# Patient Record
Sex: MALE | Race: WHITE | NOT HISPANIC OR LATINO | Employment: FULL TIME | URBAN - METROPOLITAN AREA
[De-identification: names, ages, dates, MRNs, and addresses within clinical notes are randomized per-mention and may not be internally consistent; named-entity substitution may affect disease eponyms.]

---

## 2023-09-24 ENCOUNTER — APPOINTMENT (EMERGENCY)
Dept: RADIOLOGY | Facility: HOSPITAL | Age: 53
DRG: 246 | End: 2023-09-24
Payer: COMMERCIAL

## 2023-09-24 ENCOUNTER — HOSPITAL ENCOUNTER (INPATIENT)
Facility: HOSPITAL | Age: 53
LOS: 2 days | Discharge: HOME | DRG: 246 | End: 2023-09-26
Attending: EMERGENCY MEDICINE | Admitting: INTERNAL MEDICINE
Payer: COMMERCIAL

## 2023-09-24 DIAGNOSIS — I24.9 ACUTE CORONARY SYNDROME (HCC): Primary | ICD-10-CM

## 2023-09-24 PROBLEM — E78.5 DYSLIPIDEMIA: Status: ACTIVE | Noted: 2023-09-24

## 2023-09-24 PROBLEM — I21.4 NSTEMI (NON-ST ELEVATED MYOCARDIAL INFARCTION) (HCC): Status: ACTIVE | Noted: 2023-09-24

## 2023-09-24 LAB
2HR DELTA HS TROPONIN: 2716 NG/L
4HR DELTA HS TROPONIN: 7385 NG/L
ALBUMIN SERPL BCP-MCNC: 4.6 G/DL (ref 3.5–5)
ALP SERPL-CCNC: 104 U/L (ref 34–104)
ALT SERPL W P-5'-P-CCNC: 10 U/L (ref 7–52)
ANION GAP SERPL CALCULATED.3IONS-SCNC: 13 MMOL/L
APTT PPP: 29 SECONDS (ref 23–37)
APTT PPP: 40 SECONDS (ref 23–37)
AST SERPL W P-5'-P-CCNC: 16 U/L (ref 13–39)
BASOPHILS # BLD AUTO: 0.08 THOUSANDS/ÂΜL (ref 0–0.1)
BASOPHILS NFR BLD AUTO: 1 % (ref 0–1)
BILIRUB SERPL-MCNC: 0.78 MG/DL (ref 0.2–1)
BUN SERPL-MCNC: 16 MG/DL (ref 5–25)
CALCIUM SERPL-MCNC: 10 MG/DL (ref 8.4–10.2)
CARDIAC TROPONIN I PNL SERPL HS: 2803 NG/L
CARDIAC TROPONIN I PNL SERPL HS: 7472 NG/L
CARDIAC TROPONIN I PNL SERPL HS: 87 NG/L
CHLORIDE SERPL-SCNC: 101 MMOL/L (ref 96–108)
CHOLEST SERPL-MCNC: 248 MG/DL
CO2 SERPL-SCNC: 21 MMOL/L (ref 21–32)
CREAT SERPL-MCNC: 1.28 MG/DL (ref 0.6–1.3)
EOSINOPHIL # BLD AUTO: 0.3 THOUSAND/ÂΜL (ref 0–0.61)
EOSINOPHIL NFR BLD AUTO: 3 % (ref 0–6)
ERYTHROCYTE [DISTWIDTH] IN BLOOD BY AUTOMATED COUNT: 13.2 % (ref 11.6–15.1)
GFR SERPL CREATININE-BSD FRML MDRD: 63 ML/MIN/1.73SQ M
GLUCOSE SERPL-MCNC: 133 MG/DL (ref 65–140)
HCT VFR BLD AUTO: 53.6 % (ref 36.5–49.3)
HDLC SERPL-MCNC: 42 MG/DL
HGB BLD-MCNC: 18.3 G/DL (ref 12–17)
IMM GRANULOCYTES # BLD AUTO: 0.04 THOUSAND/UL (ref 0–0.2)
IMM GRANULOCYTES NFR BLD AUTO: 0 % (ref 0–2)
INR PPP: 0.89 (ref 0.84–1.19)
LDLC SERPL CALC-MCNC: 163 MG/DL (ref 0–100)
LYMPHOCYTES # BLD AUTO: 3.85 THOUSANDS/ÂΜL (ref 0.6–4.47)
LYMPHOCYTES NFR BLD AUTO: 34 % (ref 14–44)
MAGNESIUM SERPL-MCNC: 2.1 MG/DL (ref 1.9–2.7)
MCH RBC QN AUTO: 31.6 PG (ref 26.8–34.3)
MCHC RBC AUTO-ENTMCNC: 34.1 G/DL (ref 31.4–37.4)
MCV RBC AUTO: 93 FL (ref 82–98)
MONOCYTES # BLD AUTO: 1.02 THOUSAND/ÂΜL (ref 0.17–1.22)
MONOCYTES NFR BLD AUTO: 9 % (ref 4–12)
NEUTROPHILS # BLD AUTO: 6.1 THOUSANDS/ÂΜL (ref 1.85–7.62)
NEUTS SEG NFR BLD AUTO: 53 % (ref 43–75)
NONHDLC SERPL-MCNC: 206 MG/DL
NRBC BLD AUTO-RTO: 0 /100 WBCS
PLATELET # BLD AUTO: 217 THOUSANDS/UL (ref 149–390)
PMV BLD AUTO: 9.6 FL (ref 8.9–12.7)
POTASSIUM SERPL-SCNC: 4.2 MMOL/L (ref 3.5–5.3)
PROT SERPL-MCNC: 8.1 G/DL (ref 6.4–8.4)
PROTHROMBIN TIME: 12.6 SECONDS (ref 11.6–14.5)
RBC # BLD AUTO: 5.79 MILLION/UL (ref 3.88–5.62)
SODIUM SERPL-SCNC: 135 MMOL/L (ref 135–147)
TRIGL SERPL-MCNC: 216 MG/DL
WBC # BLD AUTO: 11.39 THOUSAND/UL (ref 4.31–10.16)

## 2023-09-24 PROCEDURE — 80061 LIPID PANEL: CPT | Performed by: EMERGENCY MEDICINE

## 2023-09-24 PROCEDURE — 99285 EMERGENCY DEPT VISIT HI MDM: CPT | Performed by: EMERGENCY MEDICINE

## 2023-09-24 PROCEDURE — 80053 COMPREHEN METABOLIC PANEL: CPT | Performed by: EMERGENCY MEDICINE

## 2023-09-24 PROCEDURE — 93005 ELECTROCARDIOGRAM TRACING: CPT

## 2023-09-24 PROCEDURE — 71045 X-RAY EXAM CHEST 1 VIEW: CPT

## 2023-09-24 PROCEDURE — 85025 COMPLETE CBC W/AUTO DIFF WBC: CPT | Performed by: EMERGENCY MEDICINE

## 2023-09-24 PROCEDURE — 96374 THER/PROPH/DIAG INJ IV PUSH: CPT

## 2023-09-24 PROCEDURE — 83735 ASSAY OF MAGNESIUM: CPT | Performed by: EMERGENCY MEDICINE

## 2023-09-24 PROCEDURE — 84484 ASSAY OF TROPONIN QUANT: CPT

## 2023-09-24 PROCEDURE — 99285 EMERGENCY DEPT VISIT HI MDM: CPT

## 2023-09-24 PROCEDURE — 85730 THROMBOPLASTIN TIME PARTIAL: CPT

## 2023-09-24 PROCEDURE — 96361 HYDRATE IV INFUSION ADD-ON: CPT

## 2023-09-24 PROCEDURE — 83036 HEMOGLOBIN GLYCOSYLATED A1C: CPT

## 2023-09-24 PROCEDURE — 85730 THROMBOPLASTIN TIME PARTIAL: CPT | Performed by: EMERGENCY MEDICINE

## 2023-09-24 PROCEDURE — 84484 ASSAY OF TROPONIN QUANT: CPT | Performed by: EMERGENCY MEDICINE

## 2023-09-24 PROCEDURE — 36415 COLL VENOUS BLD VENIPUNCTURE: CPT | Performed by: EMERGENCY MEDICINE

## 2023-09-24 PROCEDURE — 99223 1ST HOSP IP/OBS HIGH 75: CPT | Performed by: INTERNAL MEDICINE

## 2023-09-24 PROCEDURE — 85610 PROTHROMBIN TIME: CPT | Performed by: EMERGENCY MEDICINE

## 2023-09-24 RX ORDER — HEPARIN SODIUM 1000 [USP'U]/ML
4000 INJECTION, SOLUTION INTRAVENOUS; SUBCUTANEOUS ONCE
Status: COMPLETED | OUTPATIENT
Start: 2023-09-24 | End: 2023-09-24

## 2023-09-24 RX ORDER — CLOPIDOGREL BISULFATE 75 MG/1
75 TABLET ORAL DAILY
Status: DISCONTINUED | OUTPATIENT
Start: 2023-09-25 | End: 2023-09-24

## 2023-09-24 RX ORDER — METOPROLOL TARTRATE 5 MG/5ML
5 INJECTION INTRAVENOUS ONCE
Status: COMPLETED | OUTPATIENT
Start: 2023-09-24 | End: 2023-09-24

## 2023-09-24 RX ORDER — SODIUM CHLORIDE 9 MG/ML
75 INJECTION, SOLUTION INTRAVENOUS CONTINUOUS
Status: DISCONTINUED | OUTPATIENT
Start: 2023-09-24 | End: 2023-09-25

## 2023-09-24 RX ORDER — HEPARIN SODIUM 1000 [USP'U]/ML
2000 INJECTION, SOLUTION INTRAVENOUS; SUBCUTANEOUS EVERY 6 HOURS PRN
Status: DISCONTINUED | OUTPATIENT
Start: 2023-09-24 | End: 2023-09-26

## 2023-09-24 RX ORDER — ACETAMINOPHEN 325 MG/1
975 TABLET ORAL EVERY 8 HOURS PRN
Status: DISCONTINUED | OUTPATIENT
Start: 2023-09-24 | End: 2023-09-26 | Stop reason: HOSPADM

## 2023-09-24 RX ORDER — ATORVASTATIN CALCIUM 40 MG/1
40 TABLET, FILM COATED ORAL
Status: DISCONTINUED | OUTPATIENT
Start: 2023-09-24 | End: 2023-09-26 | Stop reason: HOSPADM

## 2023-09-24 RX ORDER — METOPROLOL TARTRATE 5 MG/5ML
2.5 INJECTION INTRAVENOUS ONCE
Status: DISCONTINUED | OUTPATIENT
Start: 2023-09-24 | End: 2023-09-24

## 2023-09-24 RX ORDER — HEPARIN SODIUM 10000 [USP'U]/100ML
3-20 INJECTION, SOLUTION INTRAVENOUS
Status: DISCONTINUED | OUTPATIENT
Start: 2023-09-24 | End: 2023-09-26

## 2023-09-24 RX ORDER — NITROGLYCERIN 0.4 MG/1
0.4 TABLET SUBLINGUAL
Status: DISCONTINUED | OUTPATIENT
Start: 2023-09-24 | End: 2023-09-26 | Stop reason: HOSPADM

## 2023-09-24 RX ORDER — HEPARIN SODIUM 1000 [USP'U]/ML
4000 INJECTION, SOLUTION INTRAVENOUS; SUBCUTANEOUS EVERY 6 HOURS PRN
Status: DISCONTINUED | OUTPATIENT
Start: 2023-09-24 | End: 2023-09-26

## 2023-09-24 RX ORDER — SODIUM CHLORIDE 9 MG/ML
3 INJECTION INTRAVENOUS
Status: DISCONTINUED | OUTPATIENT
Start: 2023-09-24 | End: 2023-09-26 | Stop reason: HOSPADM

## 2023-09-24 RX ORDER — METOPROLOL TARTRATE 5 MG/5ML
5 INJECTION INTRAVENOUS ONCE
Status: DISCONTINUED | OUTPATIENT
Start: 2023-09-24 | End: 2023-09-24

## 2023-09-24 RX ADMIN — TICAGRELOR 180 MG: 90 TABLET ORAL at 18:42

## 2023-09-24 RX ADMIN — HEPARIN SODIUM 4000 UNITS: 1000 INJECTION INTRAVENOUS; SUBCUTANEOUS at 21:54

## 2023-09-24 RX ADMIN — HEPARIN SODIUM 11.1 UNITS/KG/HR: 10000 INJECTION, SOLUTION INTRAVENOUS at 13:34

## 2023-09-24 RX ADMIN — METOROPROLOL TARTRATE 5 MG: 5 INJECTION, SOLUTION INTRAVENOUS at 12:22

## 2023-09-24 RX ADMIN — SODIUM CHLORIDE 75 ML/HR: 0.9 INJECTION, SOLUTION INTRAVENOUS at 12:26

## 2023-09-24 RX ADMIN — ATORVASTATIN CALCIUM 40 MG: 40 TABLET, FILM COATED ORAL at 17:26

## 2023-09-24 RX ADMIN — HEPARIN SODIUM 4000 UNITS: 1000 INJECTION INTRAVENOUS; SUBCUTANEOUS at 13:34

## 2023-09-24 NOTE — ASSESSMENT & PLAN NOTE
Pt with 2 days of diarrhea, nausea. Today was resting in bed when chest discomfort started, was feeling nauseous, vomited x1, continued to feel chest discomfort, left hand numbness, worsened after talking to family, began to be diaphoretic. EMS called. Received 324 ASA, and 1 dose of nitro.   Elevated trop x1     Plan  Cath planned for tomorrow  Echo pending  Start statin, ASA, hold plavix for now, nitro prn  Tele

## 2023-09-24 NOTE — H&P
[address] [facility]  H&P  Name: Charleen Jimenez 47 y.o. male I MRN: [id number]  Unit/Bed#: S -01 I Date of Admission: 9/24/2023   Date of Service: 9/24/2023 I Hospital Day: 0      Assessment/Plan   * NSTEMI (non-ST elevated myocardial infarction) [facility])  Assessment & Plan  Pt with 2 days of diarrhea, nausea. Today was resting in bed when chest discomfort started, was feeling nauseous, vomited x1, continued to feel chest discomfort, left hand numbness, worsened after talking to family, began to be diaphoretic. EMS called. Received 324 ASA, and 1 dose of nitro. Elevated trop x1     Plan  Cath planned for tomorrow  Echo pending  Start statin, ASA, hold plavix for now, nitro prn  Tele    Dyslipidemia  Assessment & Plan  Lab Results   Component Value Date    CHOLESTEROL 248 (H) 09/24/2023    TRIG 216 (H) 09/24/2023    HDL 42 09/24/2023    LDLCALC 163 (H) 09/24/2023     Started on Lipitor 40        VTE Pharmacologic Prophylaxis: VTE Score: 2 ACS rule out. Heparin Drip  Code Status: Level 1 - Full Code   Discussion with family: Updated  (son and daughter) at bedside. Anticipated Length of Stay: Patient will be admitted on an inpatient basis with an anticipated length of stay of greater than 2 midnights secondary to NSTEMI. Chief Complaint: NSTEMI     History of Present Illness:  Charleen Jimenez is a 47 y.o. male with a PMH of none who presents with acute chest discomfort. Patient complains of nausea, and diarrhea for the past 2 days. Today while resting in bed patient felt chest discomfort which she is never felt before, nonradiating felt nauseous and vomited X1. Patient continues to feel chest discomfort and left hand numbness which worsened after talking to family and developed diaphoresis. On route via EMS patient received 324 aspirin and 1 dose of nitro which improved his chest discomfort. Patient also explains history of whitecoat hypertension.   At this moment patient is resting in bed comfortably and denies headache, chest pain, nausea, abdominal pain. Review of Systems:  Review of Systems   Constitutional: Negative for chills and fever. Respiratory: Negative for shortness of breath. Cardiovascular: Negative for chest pain and palpitations. Gastrointestinal: Negative for abdominal pain, diarrhea, nausea and vomiting. Genitourinary: Negative for dysuria. Neurological: Negative for numbness and headaches. Past Medical and Surgical History:   History reviewed. No pertinent past medical history. History reviewed. No pertinent surgical history. Meds/Allergies:  Prior to Admission medications    Not on File     I have reviewed home medications with patient personally. Allergies: Allergies   Allergen Reactions   • Zoloft [Sertraline] Hives       Social History:  Marital Status: Single   Occupation: desk job   Patient Pre-hospital Living Situation: Home  Patient Pre-hospital Level of Mobility: walks  Patient Pre-hospital Diet Restrictions: none  Substance Use History:   Social History     Substance and Sexual Activity   Alcohol Use Never     Social History     Tobacco Use   Smoking Status Every Day   • Packs/day: 0.50   • Years: 20.00   • Total pack years: 10.00   • Types: Cigarettes   Smokeless Tobacco Not on file     Social History     Substance and Sexual Activity   Drug Use Never       Family History:  History reviewed. No pertinent family history. Physical Exam:     Vitals:   Blood Pressure: 136/89 (09/24/23 1423)  Pulse: 56 (09/24/23 1423)  Temperature: 98 °F (36.7 °C) (09/24/23 1220)  Temp Source: Oral (09/24/23 1220)  Respirations: 18 (09/24/23 1423)  Height: 6' 4" (193 cm) (09/24/23 1220)  Weight - Scale: 122 kg (268 lb 8.3 oz) (09/24/23 1214)  SpO2: 99 % (09/24/23 1423)    Physical Exam  Vitals and nursing note reviewed. Constitutional:       General: He is not in acute distress. Appearance: Normal appearance.    HENT:      Head: Normocephalic and atraumatic. Cardiovascular:      Rate and Rhythm: Normal rate and regular rhythm. Pulses: Normal pulses. Heart sounds: Normal heart sounds. No murmur heard. Pulmonary:      Effort: Pulmonary effort is normal.      Breath sounds: Wheezing present. Abdominal:      General: Bowel sounds are normal. There is no distension. Palpations: Abdomen is soft. Tenderness: There is no abdominal tenderness. There is no guarding. Neurological:      Mental Status: He is alert. Additional Data:     Lab Results:  Results from last 7 days   Lab Units 09/24/23  1221   WBC Thousand/uL 11.39*   HEMOGLOBIN g/dL 18.3*   HEMATOCRIT % 53.6*   PLATELETS Thousands/uL 217   NEUTROS PCT % 53   LYMPHS PCT % 34   MONOS PCT % 9   EOS PCT % 3     Results from last 7 days   Lab Units 09/24/23  1221   SODIUM mmol/L 135   POTASSIUM mmol/L 4.2   CHLORIDE mmol/L 101   CO2 mmol/L 21   BUN mg/dL 16   CREATININE mg/dL 1.28   ANION GAP mmol/L 13   CALCIUM mg/dL 10.0   ALBUMIN g/dL 4.6   TOTAL BILIRUBIN mg/dL 0.78   ALK PHOS U/L 104   ALT U/L 10   AST U/L 16   GLUCOSE RANDOM mg/dL 133     Results from last 7 days   Lab Units 09/24/23  1221   INR  0.89                   Lines/Drains:  Invasive Devices     Peripheral Intravenous Line  Duration           Peripheral IV 09/24/23 Left Antecubital <1 day    Peripheral IV 09/24/23 Right Antecubital <1 day                    Imaging: Reviewed radiology reports from this admission including: chest xray  XR chest 1 view portable   ED Interpretation by Rach Perales DO (09/24 1248)   No acute abnormality no infiltrate or effusion interpreted by me. EKG and Other Studies Reviewed on Admission:   · EKG: NSR. HR 62.    ** Please Note: This note has been constructed using a voice recognition system.  **

## 2023-09-24 NOTE — ED PROVIDER NOTES
History  Chief Complaint   Patient presents with   • Chest Pain     324 ASA given and 1 nitro PTA given by EMS     Hx from patient and paramedics - Chest discomfort  Pressure 30 minutes pta at rest.  Vomited once, diarrhea one episode. Tried smoking a cigarette and some advil without relief. , paramedics gave aspirin and 1 sl nitro and patient feels better but anxious. Paramedics noted st elevation on ekg anterior leads. None       History reviewed. No pertinent past medical history. History reviewed. No pertinent surgical history. History reviewed. No pertinent family history. I have reviewed and agree with the history as documented. E-Cigarette/Vaping     E-Cigarette/Vaping Substances     Social History     Tobacco Use   • Smoking status: Every Day     Packs/day: 0.50     Years: 20.00     Total pack years: 10.00     Types: Cigarettes   Substance Use Topics   • Alcohol use: Never   • Drug use: Never       Review of Systems   Constitutional: Positive for diaphoresis. Negative for chills and fever. HENT: Positive for congestion. Negative for rhinorrhea and sore throat. Respiratory: Positive for chest tightness. Negative for shortness of breath. Cardiovascular: Positive for chest pain. Gastrointestinal: Negative for constipation, diarrhea, nausea and vomiting. Genitourinary: Negative for dysuria and frequency. Skin: Negative for rash. All other systems reviewed and are negative. Physical Exam  Physical Exam  Vitals and nursing note reviewed. Constitutional:       Appearance: He is well-developed. He is diaphoretic. HENT:      Head: Normocephalic and atraumatic. Right Ear: External ear normal.      Left Ear: External ear normal.      Nose: Nose normal.   Eyes:      Conjunctiva/sclera: Conjunctivae normal.      Pupils: Pupils are equal, round, and reactive to light. Cardiovascular:      Rate and Rhythm: Normal rate and regular rhythm.       Heart sounds: Normal heart sounds. Pulmonary:      Effort: Pulmonary effort is normal. No respiratory distress. Breath sounds: Normal breath sounds. No wheezing. Chest:      Chest wall: No tenderness. Abdominal:      General: Bowel sounds are normal. There is no distension. Palpations: Abdomen is soft. Tenderness: There is no abdominal tenderness. Musculoskeletal:         General: No deformity. Normal range of motion. Cervical back: Normal range of motion and neck supple. No spinous process tenderness. Skin:     General: Skin is warm. Findings: No rash. Neurological:      General: No focal deficit present. Mental Status: He is alert. GCS: GCS eye subscore is 4. GCS verbal subscore is 5. GCS motor subscore is 6. Sensory: No sensory deficit. Psychiatric:         Mood and Affect: Mood is anxious.          Vital Signs  ED Triage Vitals   Temperature Pulse Respirations Blood Pressure SpO2   09/24/23 1220 09/24/23 1214 09/24/23 1214 09/24/23 1214 09/24/23 1214   98 °F (36.7 °C) 64 22 (!) 171/110 98 %      Temp Source Heart Rate Source Patient Position - Orthostatic VS BP Location FiO2 (%)   09/24/23 1220 09/24/23 1214 09/24/23 1214 09/24/23 1214 --   Oral Monitor Sitting Right arm       Pain Score       09/24/23 1416       No Pain           Vitals:    09/24/23 1230 09/24/23 1330 09/24/23 1345 09/24/23 1423   BP: (!) 163/101 140/95 138/98 136/89   Pulse: 58 (!) 54 56 56   Patient Position - Orthostatic VS: Sitting Sitting Sitting Sitting         Visual Acuity  Visual Acuity    Flowsheet Row Most Recent Value   L Pupil Size (mm) 4   R Pupil Size (mm) 4          ED Medications  Medications   sodium chloride (PF) 0.9 % injection 3 mL (has no administration in time range)   sodium chloride 0.9 % infusion (75 mL/hr Intravenous New Bag 9/24/23 1226)   heparin (porcine) 25,000 units in 0.45% NaCl 250 mL infusion (premix) (11.1 Units/kg/hr × 90 kg (Order-Specific) Intravenous Restarted 9/24/23 1427) heparin (porcine) injection 4,000 Units (has no administration in time range)   heparin (porcine) injection 2,000 Units (has no administration in time range)   acetaminophen (TYLENOL) tablet 975 mg (has no administration in time range)   nitroglycerin (NITROSTAT) SL tablet 0.4 mg (has no administration in time range)   atorvastatin (LIPITOR) tablet 40 mg (has no administration in time range)   aspirin (ECOTRIN LOW STRENGTH) EC tablet 81 mg (has no administration in time range)   metoprolol (LOPRESSOR) injection 5 mg (5 mg Intravenous Given 9/24/23 1222)   heparin (porcine) injection 4,000 Units (4,000 Units Intravenous Given 9/24/23 1334)       Diagnostic Studies  Results Reviewed     Procedure Component Value Units Date/Time    APTT six (6) hours after Heparin bolus/drip initiation or dosing change [[id number]]     Lab Status: No result Specimen: Blood     HS Troponin I 2hr [[id number]]  (Abnormal) Collected: 09/24/23 1515    Lab Status: Final result Specimen: Blood from Arm, Left Updated: 09/24/23 1558     hs TnI 2hr 2,803 ng/L      Delta 2hr hsTnI 2,716 ng/L     HS Troponin I 4hr [[id number]]     Lab Status: No result Specimen: Blood     HS Troponin 0hr (reflex protocol) [[id number]]  (Abnormal) Collected: 09/24/23 1221    Lab Status: Final result Specimen: Blood from Arm, Left Updated: 09/24/23 1254     hs TnI 0hr 87 ng/L     Lipid panel [[id number]]  (Abnormal) Collected: 09/24/23 1221    Lab Status: Final result Specimen: Blood from Arm, Left Updated: 09/24/23 1249     Cholesterol 248 mg/dL      Triglycerides 216 mg/dL      HDL, Direct 42 mg/dL      LDL Calculated 163 mg/dL      Non-HDL-Chol (CHOL-HDL) 206 mg/dl     Magnesium [[id number]]  (Normal) Collected: 09/24/23 1221    Lab Status: Final result Specimen: Blood from Arm, Left Updated: 09/24/23 1249     Magnesium 2.1 mg/dL     Comprehensive metabolic panel [[id number]] Collected: 09/24/23 1221    Lab Status: Final result Specimen: Blood from Arm, Left Updated: 09/24/23 1249     Sodium 135 mmol/L      Potassium 4.2 mmol/L      Chloride 101 mmol/L      CO2 21 mmol/L      ANION GAP 13 mmol/L      BUN 16 mg/dL      Creatinine 1.28 mg/dL      Glucose 133 mg/dL      Calcium 10.0 mg/dL      AST 16 U/L      ALT 10 U/L      Alkaline Phosphatase 104 U/L      Total Protein 8.1 g/dL      Albumin 4.6 g/dL      Total Bilirubin 0.78 mg/dL      eGFR 63 ml/min/1.73sq m     Narrative:      [facility] (CKD):   •  Stage 1 with normal or high GFR (GFR > 90 mL/min/1.73 square meters)  •  Stage 2 Mild CKD (GFR = 60-89 mL/min/1.73 square meters)  •  Stage 3A Moderate CKD (GFR = 45-59 mL/min/1.73 square meters)  •  Stage 3B Moderate CKD (GFR = 30-44 mL/min/1.73 square meters)  •  Stage 4 Severe CKD (GFR = 15-29 mL/min/1.73 square meters)  •  Stage 5 End Stage CKD (GFR <15 mL/min/1.73 square meters)  Note: GFR calculation is accurate only with a steady state creatinine    Protime-INR [[id number]]  (Normal) Collected: 09/24/23 1221    Lab Status: Final result Specimen: Blood from Arm, Left Updated: 09/24/23 1243     Protime 12.6 seconds      INR 0.89    APTT [[id number]]  (Normal) Collected: 09/24/23 1221    Lab Status: Final result Specimen: Blood from Arm, Left Updated: 09/24/23 1243     PTT 29 seconds     CBC and differential [[id number]]  (Abnormal) Collected: 09/24/23 1221    Lab Status: Final result Specimen: Blood from Arm, Left Updated: 09/24/23 1233     WBC 11.39 Thousand/uL      RBC 5.79 Million/uL      Hemoglobin 18.3 g/dL      Hematocrit 53.6 %      MCV 93 fL      MCH 31.6 pg      MCHC 34.1 g/dL      RDW 13.2 %      MPV 9.6 fL      Platelets 795 Thousands/uL      nRBC 0 /100 WBCs      Neutrophils Relative 53 %      Immat GRANS % 0 %      Lymphocytes Relative 34 %      Monocytes Relative 9 %      Eosinophils Relative 3 %      Basophils Relative 1 %      Neutrophils Absolute 6.10 Thousands/µL      Immature Grans Absolute 0.04 Thousand/uL      Lymphocytes Absolute 3.85 Thousands/µL      Monocytes Absolute 1.02 Thousand/µL      Eosinophils Absolute 0.30 Thousand/µL      Basophils Absolute 0.08 Thousands/µL                  XR chest 1 view portable   ED Interpretation by Betty Rodriguez DO (09/24 1248)   No acute abnormality no infiltrate or effusion interpreted by me. Procedures  ECG 12 Lead Documentation Only    Date/Time: 9/24/2023 12:23 PM    Performed by: Betty Rodriguez DO  Authorized by: Betty Rodriguez DO    Indications / Diagnosis:  CHEST PAIN  ECG reviewed by me, the ED Provider: yes    Patient location:  ED  Previous ECG:     Previous ECG:  Compared to current    Comparison ECG info:  Ekg from ambulance with st elevations v1, v2, v3    Similarity:  Changes noted  Interpretation:     Interpretation: non-specific    Rate:     ECG rate:  62    ECG rate assessment: normal    Rhythm:     Rhythm: sinus rhythm    Ectopy:     Ectopy: none    QRS:     QRS axis:  Normal    QRS intervals:  Normal  Conduction:     Conduction: normal    ST segments:     ST segments:  Normal  T waves:     T waves: non-specific and inverted      Inverted:  AVR, aVL and V2  Comments: This EKG was interpreted by me. ED Course                               SBIRT 22yo+    Flowsheet Row Most Recent Value   Initial Alcohol Screen: US AUDIT-C     1. How often do you have a drink containing alcohol? 0 Filed at: 09/24/2023 1233   2. How many drinks containing alcohol do you have on a typical day you are drinking? 0 Filed at: 09/24/2023 1233   3a. Male UNDER 65: How often do you have five or more drinks on one occasion? 0 Filed at: 09/24/2023 1233   Audit-C Score 0 Filed at: 09/24/2023 1233   GILA: How many times in the past year have you. .. Used an illegal drug or used a prescription medication for non-medical reasons?  Never Filed at: 09/24/2023 1233                    Medical Decision Making  Differential includes acute coronary syndrome, coronary ischemia - less likely acute MI - reviewed ekgs with interventionist and cardiologist - agree with heparin, BP control, cath tomorrow unless symptoms recur then consider more emergent cath. Amount and/or Complexity of Data Reviewed  Labs: ordered. Radiology: ordered and independent interpretation performed. Risk  Prescription drug management. Decision regarding hospitalization. Disposition  Final diagnoses:   Acute coronary syndrome [facility])     Time reflects when diagnosis was documented in both MDM as applicable and the Disposition within this note     Time User Action Codes Description Comment    9/24/2023  1:31 PM Silvanaphvidya Ripangela Add [I24.9] Acute coronary syndrome ([address])     9/24/2023  3:53 PM Melodie Mcintosh Add [I21.4] NSTEMI (non-ST elevated myocardial infarction) ([address])     9/24/2023  3:53 PM Melodie Mcintosh Remove [I21.4] NSTEMI (non-ST elevated myocardial infarction) [facility])       ED Disposition     ED Disposition   Admit    Condition   Stable    Date/Time   Sun Sep 24, 2023  1:10 PM    Comment   Case was discussed with and the patient's admission status was agreed to be Admission Status: inpatient status to the service of Dr. . Alida Peabody    None         There are no discharge medications for this patient. No discharge procedures on file.     PDMP Review     None          ED Provider  Electronically Signed by           Rosana Godoy DO  09/24/23 2510

## 2023-09-24 NOTE — PLAN OF CARE
Problem: PAIN - ADULT  Goal: Verbalizes/displays adequate comfort level or baseline comfort level  Description: Interventions:  - Encourage patient to monitor pain and request assistance  - Assess pain using appropriate pain scale  - Administer analgesics based on type and severity of pain and evaluate response  - Implement non-pharmacological measures as appropriate and evaluate response  - Consider cultural and social influences on pain and pain management  - Notify physician/advanced practitioner if interventions unsuccessful or patient reports new pain  Outcome: Progressing     Problem: INFECTION - ADULT  Goal: Absence or prevention of progression during hospitalization  Description: INTERVENTIONS:  - Assess and monitor for signs and symptoms of infection  - Monitor lab/diagnostic results  - Monitor all insertion sites, i.e. indwelling lines, tubes, and drains  - Monitor endotracheal if appropriate and nasal secretions for changes in amount and color  - [place] appropriate cooling/warming therapies per order  - Administer medications as ordered  - Instruct and encourage patient and family to use good hand hygiene technique  - Identify and instruct in appropriate isolation precautions for identified infection/condition  Outcome: Progressing     Problem: Knowledge Deficit  Goal: Patient/family/caregiver demonstrates understanding of disease process, treatment plan, medications, and discharge instructions  Description: Complete learning assessment and assess knowledge base.   Interventions:  - Provide teaching at level of understanding  - Provide teaching via preferred learning methods  Outcome: Progressing

## 2023-09-24 NOTE — ASSESSMENT & PLAN NOTE
Lab Results   Component Value Date    CHOLESTEROL 248 (H) 09/24/2023    TRIG 216 (H) 09/24/2023    HDL 42 09/24/2023    LDLCALC 163 (H) 09/24/2023     Started on Lipitor 40

## 2023-09-24 NOTE — Clinical Note
Prepped: right groin and right radial. Prepped with: chlorhexidine. The site was clipped. The patient was draped.

## 2023-09-25 ENCOUNTER — APPOINTMENT (INPATIENT)
Dept: NON INVASIVE DIAGNOSTICS | Facility: HOSPITAL | Age: 53
DRG: 246 | End: 2023-09-25
Payer: COMMERCIAL

## 2023-09-25 PROBLEM — D58.2 ELEVATED HEMOGLOBIN (HCC): Status: ACTIVE | Noted: 2023-09-25

## 2023-09-25 PROBLEM — R19.7 DIARRHEA: Status: ACTIVE | Noted: 2023-09-25

## 2023-09-25 PROBLEM — I25.10 CORONARY ARTERY DISEASE INVOLVING NATIVE CORONARY ARTERY: Status: ACTIVE | Noted: 2023-09-25

## 2023-09-25 PROBLEM — D72.829 LEUKOCYTOSIS: Status: ACTIVE | Noted: 2023-09-25

## 2023-09-25 LAB
ANION GAP SERPL CALCULATED.3IONS-SCNC: 8 MMOL/L
AORTIC ROOT: 3.6 CM
APICAL FOUR CHAMBER EJECTION FRACTION: 65 %
APTT PPP: 59 SECONDS (ref 23–37)
APTT PPP: 65 SECONDS (ref 23–37)
ASCENDING AORTA: 4 CM
ATRIAL RATE: 55 BPM
ATRIAL RATE: 62 BPM
ATRIAL RATE: 67 BPM
BUN SERPL-MCNC: 15 MG/DL (ref 5–25)
CALCIUM SERPL-MCNC: 8.6 MG/DL (ref 8.4–10.2)
CHLORIDE SERPL-SCNC: 107 MMOL/L (ref 96–108)
CO2 SERPL-SCNC: 20 MMOL/L (ref 21–32)
CREAT SERPL-MCNC: 0.92 MG/DL (ref 0.6–1.3)
E WAVE DECELERATION TIME: 275 MS
ERYTHROCYTE [DISTWIDTH] IN BLOOD BY AUTOMATED COUNT: 13.2 % (ref 11.6–15.1)
EST. AVERAGE GLUCOSE BLD GHB EST-MCNC: 117 MG/DL
EST. AVERAGE GLUCOSE BLD GHB EST-MCNC: 117 MG/DL
FRACTIONAL SHORTENING: 30 (ref 28–44)
GFR SERPL CREATININE-BSD FRML MDRD: 95 ML/MIN/1.73SQ M
GLUCOSE SERPL-MCNC: 106 MG/DL (ref 65–140)
HBA1C MFR BLD: 5.7 %
HBA1C MFR BLD: 5.7 %
HCT VFR BLD AUTO: 48.1 % (ref 36.5–49.3)
HGB BLD-MCNC: 16.6 G/DL (ref 12–17)
INTERVENTRICULAR SEPTUM IN DIASTOLE (PARASTERNAL SHORT AXIS VIEW): 1.4 CM
INTERVENTRICULAR SEPTUM: 1.4 CM (ref 0.6–1.1)
LAAS-AP2: 13 CM2
LAAS-AP4: 16.3 CM2
LEFT ATRIUM SIZE: 3.5 CM
LEFT ATRIUM VOLUME (MOD BIPLANE): 35 ML
LEFT INTERNAL DIMENSION IN SYSTOLE: 3.2 CM (ref 2.1–4)
LEFT VENTRICULAR INTERNAL DIMENSION IN DIASTOLE: 4.6 CM (ref 3.5–6)
LEFT VENTRICULAR POSTERIOR WALL IN END DIASTOLE: 1.2 CM
LEFT VENTRICULAR STROKE VOLUME: 54 ML
LVSV (TEICH): 54 ML
MAGNESIUM SERPL-MCNC: 1.9 MG/DL (ref 1.9–2.7)
MCH RBC QN AUTO: 32.1 PG (ref 26.8–34.3)
MCHC RBC AUTO-ENTMCNC: 34.5 G/DL (ref 31.4–37.4)
MCV RBC AUTO: 93 FL (ref 82–98)
MV E'TISSUE VEL-LAT: 11 CM/S
MV E'TISSUE VEL-SEP: 9 CM/S
MV PEAK A VEL: 0.67 M/S
MV PEAK E VEL: 47 CM/S
MV STENOSIS PRESSURE HALF TIME: 80 MS
MV VALVE AREA P 1/2 METHOD: 2.75
P AXIS: 41 DEGREES
P AXIS: 56 DEGREES
P AXIS: 61 DEGREES
PLATELET # BLD AUTO: 191 THOUSANDS/UL (ref 149–390)
PMV BLD AUTO: 9.3 FL (ref 8.9–12.7)
POTASSIUM SERPL-SCNC: 4 MMOL/L (ref 3.5–5.3)
PR INTERVAL: 152 MS
PR INTERVAL: 152 MS
PR INTERVAL: 164 MS
QRS AXIS: 21 DEGREES
QRS AXIS: 36 DEGREES
QRS AXIS: 46 DEGREES
QRSD INTERVAL: 82 MS
QRSD INTERVAL: 84 MS
QRSD INTERVAL: 88 MS
QT INTERVAL: 392 MS
QT INTERVAL: 410 MS
QT INTERVAL: 418 MS
QTC INTERVAL: 392 MS
QTC INTERVAL: 414 MS
QTC INTERVAL: 424 MS
RBC # BLD AUTO: 5.17 MILLION/UL (ref 3.88–5.62)
RIGHT ATRIUM AREA SYSTOLE A4C: 13.5 CM2
RIGHT VENTRICLE ID DIMENSION: 2.8 CM
SL CV LEFT ATRIUM LENGTH A2C: 4.7 CM
SL CV LV EF: 65
SL CV PED ECHO LEFT VENTRICLE DIASTOLIC VOLUME (MOD BIPLANE) 2D: 95 ML
SL CV PED ECHO LEFT VENTRICLE SYSTOLIC VOLUME (MOD BIPLANE) 2D: 41 ML
SODIUM SERPL-SCNC: 135 MMOL/L (ref 135–147)
T WAVE AXIS: 94 DEGREES
T WAVE AXIS: 98 DEGREES
T WAVE AXIS: 99 DEGREES
TR MAX PG: 21 MMHG
TR PEAK VELOCITY: 2.3 M/S
TRICUSPID ANNULAR PLANE SYSTOLIC EXCURSION: 2.3 CM
TRICUSPID VALVE PEAK REGURGITATION VELOCITY: 2.27 M/S
VENTRICULAR RATE: 55 BPM
VENTRICULAR RATE: 62 BPM
VENTRICULAR RATE: 67 BPM
WBC # BLD AUTO: 11.48 THOUSAND/UL (ref 4.31–10.16)

## 2023-09-25 PROCEDURE — C1894 INTRO/SHEATH, NON-LASER: HCPCS | Performed by: INTERNAL MEDICINE

## 2023-09-25 PROCEDURE — 85347 COAGULATION TIME ACTIVATED: CPT

## 2023-09-25 PROCEDURE — C1725 CATH, TRANSLUMIN NON-LASER: HCPCS | Performed by: INTERNAL MEDICINE

## 2023-09-25 PROCEDURE — 99152 MOD SED SAME PHYS/QHP 5/>YRS: CPT | Performed by: INTERNAL MEDICINE

## 2023-09-25 PROCEDURE — 93454 CORONARY ARTERY ANGIO S&I: CPT | Performed by: INTERNAL MEDICINE

## 2023-09-25 PROCEDURE — 93306 TTE W/DOPPLER COMPLETE: CPT | Performed by: INTERNAL MEDICINE

## 2023-09-25 PROCEDURE — 85730 THROMBOPLASTIN TIME PARTIAL: CPT | Performed by: INTERNAL MEDICINE

## 2023-09-25 PROCEDURE — 92978 ENDOLUMINL IVUS OCT C 1ST: CPT | Performed by: INTERNAL MEDICINE

## 2023-09-25 PROCEDURE — 93010 ELECTROCARDIOGRAM REPORT: CPT | Performed by: INTERNAL MEDICINE

## 2023-09-25 PROCEDURE — C1769 GUIDE WIRE: HCPCS | Performed by: INTERNAL MEDICINE

## 2023-09-25 PROCEDURE — C1874 STENT, COATED/COV W/DEL SYS: HCPCS | Performed by: INTERNAL MEDICINE

## 2023-09-25 PROCEDURE — 99233 SBSQ HOSP IP/OBS HIGH 50: CPT | Performed by: INTERNAL MEDICINE

## 2023-09-25 PROCEDURE — 87493 C DIFF AMPLIFIED PROBE: CPT

## 2023-09-25 PROCEDURE — 93306 TTE W/DOPPLER COMPLETE: CPT

## 2023-09-25 PROCEDURE — 83036 HEMOGLOBIN GLYCOSYLATED A1C: CPT

## 2023-09-25 PROCEDURE — 99153 MOD SED SAME PHYS/QHP EA: CPT | Performed by: INTERNAL MEDICINE

## 2023-09-25 PROCEDURE — C9600 PERC DRUG-EL COR STENT SING: HCPCS | Performed by: INTERNAL MEDICINE

## 2023-09-25 PROCEDURE — B2111ZZ FLUOROSCOPY OF MULTIPLE CORONARY ARTERIES USING LOW OSMOLAR CONTRAST: ICD-10-PCS | Performed by: INTERNAL MEDICINE

## 2023-09-25 PROCEDURE — 87505 NFCT AGENT DETECTION GI: CPT

## 2023-09-25 PROCEDURE — 85027 COMPLETE CBC AUTOMATED: CPT

## 2023-09-25 PROCEDURE — C1887 CATHETER, GUIDING: HCPCS | Performed by: INTERNAL MEDICINE

## 2023-09-25 PROCEDURE — 83735 ASSAY OF MAGNESIUM: CPT

## 2023-09-25 PROCEDURE — 99255 IP/OBS CONSLTJ NEW/EST HI 80: CPT | Performed by: INTERNAL MEDICINE

## 2023-09-25 PROCEDURE — 80048 BASIC METABOLIC PNL TOTAL CA: CPT

## 2023-09-25 PROCEDURE — C1757 CATH, THROMBECTOMY/EMBOLECT: HCPCS | Performed by: INTERNAL MEDICINE

## 2023-09-25 PROCEDURE — 99254 IP/OBS CNSLTJ NEW/EST MOD 60: CPT | Performed by: INTERNAL MEDICINE

## 2023-09-25 PROCEDURE — C1753 CATH, INTRAVAS ULTRASOUND: HCPCS | Performed by: INTERNAL MEDICINE

## 2023-09-25 PROCEDURE — 027037Z DILATION OF CORONARY ARTERY, ONE ARTERY WITH FOUR OR MORE DRUG-ELUTING INTRALUMINAL DEVICES, PERCUTANEOUS APPROACH: ICD-10-PCS | Performed by: INTERNAL MEDICINE

## 2023-09-25 PROCEDURE — 02C03ZZ EXTIRPATION OF MATTER FROM CORONARY ARTERY, ONE ARTERY, PERCUTANEOUS APPROACH: ICD-10-PCS | Performed by: INTERNAL MEDICINE

## 2023-09-25 PROCEDURE — 92928 PRQ TCAT PLMT NTRAC ST 1 LES: CPT | Performed by: INTERNAL MEDICINE

## 2023-09-25 DEVICE — STENT ONYXNG40008UX ONYX 4.00X08RX
Type: IMPLANTABLE DEVICE | Status: FUNCTIONAL
Brand: ONYX FRONTIER™

## 2023-09-25 DEVICE — STENT ONYXNG40012UX ONYX 4.00X12RX
Type: IMPLANTABLE DEVICE | Status: FUNCTIONAL
Brand: ONYX FRONTIER™

## 2023-09-25 DEVICE — STENT ONYXNG35008UX ONYX 3.50X08RX
Type: IMPLANTABLE DEVICE | Status: FUNCTIONAL
Brand: ONYX FRONTIER™

## 2023-09-25 DEVICE — STENT ONYXNG27518UX ONYX 2.75X18RX
Type: IMPLANTABLE DEVICE | Status: FUNCTIONAL
Brand: ONYX FRONTIER™

## 2023-09-25 RX ORDER — LOPERAMIDE HYDROCHLORIDE 2 MG/1
2 CAPSULE ORAL 3 TIMES DAILY PRN
Status: DISCONTINUED | OUTPATIENT
Start: 2023-09-25 | End: 2023-09-25

## 2023-09-25 RX ORDER — VERAPAMIL HCL 2.5 MG/ML
AMPUL (ML) INTRAVENOUS CODE/TRAUMA/SEDATION MEDICATION
Status: DISCONTINUED | OUTPATIENT
Start: 2023-09-25 | End: 2023-09-25 | Stop reason: HOSPADM

## 2023-09-25 RX ORDER — FENTANYL CITRATE 50 UG/ML
INJECTION, SOLUTION INTRAMUSCULAR; INTRAVENOUS CODE/TRAUMA/SEDATION MEDICATION
Status: DISCONTINUED | OUTPATIENT
Start: 2023-09-25 | End: 2023-09-25 | Stop reason: HOSPADM

## 2023-09-25 RX ORDER — LIDOCAINE HYDROCHLORIDE 10 MG/ML
INJECTION, SOLUTION EPIDURAL; INFILTRATION; INTRACAUDAL; PERINEURAL CODE/TRAUMA/SEDATION MEDICATION
Status: DISCONTINUED | OUTPATIENT
Start: 2023-09-25 | End: 2023-09-25 | Stop reason: HOSPADM

## 2023-09-25 RX ORDER — MIDAZOLAM HYDROCHLORIDE 2 MG/2ML
INJECTION, SOLUTION INTRAMUSCULAR; INTRAVENOUS CODE/TRAUMA/SEDATION MEDICATION
Status: DISCONTINUED | OUTPATIENT
Start: 2023-09-25 | End: 2023-09-25 | Stop reason: HOSPADM

## 2023-09-25 RX ORDER — HEPARIN SODIUM 1000 [USP'U]/ML
INJECTION, SOLUTION INTRAVENOUS; SUBCUTANEOUS CODE/TRAUMA/SEDATION MEDICATION
Status: DISCONTINUED | OUTPATIENT
Start: 2023-09-25 | End: 2023-09-25 | Stop reason: HOSPADM

## 2023-09-25 RX ORDER — NITROGLYCERIN 20 MG/100ML
INJECTION INTRAVENOUS CODE/TRAUMA/SEDATION MEDICATION
Status: DISCONTINUED | OUTPATIENT
Start: 2023-09-25 | End: 2023-09-25 | Stop reason: HOSPADM

## 2023-09-25 RX ORDER — VERAPAMIL HYDROCHLORIDE 2.5 MG/ML
INJECTION, SOLUTION INTRAVENOUS CODE/TRAUMA/SEDATION MEDICATION
Status: DISCONTINUED | OUTPATIENT
Start: 2023-09-25 | End: 2023-09-25 | Stop reason: HOSPADM

## 2023-09-25 RX ADMIN — LOPERAMIDE HYDROCHLORIDE 2 MG: 2 CAPSULE ORAL at 04:30

## 2023-09-25 RX ADMIN — HEPARIN SODIUM 17.1 UNITS/KG/HR: 10000 INJECTION, SOLUTION INTRAVENOUS at 09:53

## 2023-09-25 RX ADMIN — TICAGRELOR 90 MG: 90 TABLET ORAL at 20:24

## 2023-09-25 RX ADMIN — TICAGRELOR 90 MG: 90 TABLET ORAL at 05:45

## 2023-09-25 RX ADMIN — HEPARIN SODIUM 2000 UNITS: 1000 INJECTION INTRAVENOUS; SUBCUTANEOUS at 05:40

## 2023-09-25 RX ADMIN — ASPIRIN 81 MG: 81 TABLET ORAL at 08:25

## 2023-09-25 RX ADMIN — LOPERAMIDE HYDROCHLORIDE 2 MG: 2 CAPSULE ORAL at 01:37

## 2023-09-25 RX ADMIN — ATORVASTATIN CALCIUM 40 MG: 40 TABLET, FILM COATED ORAL at 17:14

## 2023-09-25 NOTE — ASSESSMENT & PLAN NOTE
Prior to arrival patient complained of 2 days of watery diarrhea with nausea and vomiting x1. Patient continues to have watery diarrhea overnight 10-15 episodes. Also noticing some blood. Patient has a history of hemorrhoids. Plan  GI consult given heparin gtt  Trend Hgb   Stool studies to r/o c diff and other bacteria   Avoid imodium.  Consider Questran if worsening diarrhea

## 2023-09-25 NOTE — ASSESSMENT & PLAN NOTE
Most likely secondary to hemoconcentration versus reactive to lower GI virus   Results from last 7 days   Lab Units 09/25/23  1158 09/24/23  1221   WBC Thousand/uL 11.48* 11.39*   HEMOGLOBIN g/dL 16.6 18.3*   HEMATOCRIT % 48.1 53.6*   PLATELETS Thousands/uL 191 217   NEUTROS PCT %  --  53   LYMPHS PCT %  --  34   MONOS PCT %  --  9   EOS PCT %  --  3

## 2023-09-25 NOTE — ASSESSMENT & PLAN NOTE
Most likely secondary to hemoconcentration secondary to recent diarrhea    Recent Labs     09/24/23  1221 09/25/23  1158   HGB 18.3* 16.6

## 2023-09-25 NOTE — ASSESSMENT & PLAN NOTE
Pt with 2 days of diarrhea, nausea. Today was resting in bed when chest discomfort started, was feeling nauseous, vomited x1, continued to feel chest discomfort, left hand numbness, worsened after talking to family, began to be diaphoretic. EMS called. Received 324 ASA, and 1 dose of nitro.   Elevated trop x3    Plan  Cath scheduled for today   Echo pending  Continue statin, ASA, brillinta  Tele

## 2023-09-25 NOTE — CONSULTS
Consultation - [address] [place] [address] [facility]  [facility] 47 y.o. male MRN: [id number]  Unit/Bed#: S -01 Encounter: [id number]        Inpatient consult to gastroenterology  Consult performed by: Rita Mar PA-C  Consult ordered by: Darinel Tyler DO          Reason for Consult / Principal Problem: blood in stool    ASSESSMENT and PLAN:      56-year-old male who presented due to chest pain with concern for MI with plans for cardiac catheterization today, GI consulted for diarrhea with blood in stool. 1.  Diarrhea with blood in stool without abdominal pain  Patient reports some loose stools over the past few days followed by more significant diarrhea overnight. No associate abdominal pain. Reports initially nonbloody then with some bright red blood which he suspects is from significant mount of straining. No prior colonoscopy. Due to acute onset, possibly infectious etiology versus medicine side effect with possible hemorrhoids, underlying IBD appears less likely though is overdue for colon cancer screening as well. -In the setting of concern for MI at this time with plans for catheterization today, would recommend conservative management of diarrhea and blood in stool at this time. - From a GI standpoint, no indication to stop anticoagulation.  -Agree with stool studies. Follow-up results.  -Hemoglobin relatively stable at 16. Continue to monitor.  -Would recommend avoiding Imodium until C. difficile study comes back. Could consider Questran in the meantime if diarrhea becomes persistent.    -Patient is overdue for colonoscopy. If blood in the stool/diarrhea improve and hemoglobin stays stable this can be performed in the outpatient setting.   Would then need to discuss with cardiology in terms of timing for holding anticoagulation.  -Otherwise, would consider inpatient colonoscopy only if persistent significant blood in the stool or diarrhea leading to electrolyte imbalances, drops in hemoglobin, etc.    -------------------------------------------------------------------------------------------------------------------    HPI:     Terrell Avalos is a 60-year-old male with history of dyslipidemia who presented to the emergency room yesterday for chest pain with ST elevations on EKG during EMS on heparin drip seen by cardiology with plans for cardiac catheterization today. GI was consulted due to some loose stools with blood in stool. Hemoglobin was 18 on arrival, repeat this morning 16. Patient reports having some loose stools for the past few days with significant urgency however no blood in the stool. Since arrival he has been having more persistent diarrhea which was very urgent last night until he received Imodium. He reports the bowel movements were not bloody at first however became bloody. He reports he believes this is due to hemorrhoids due to the significant amount of straining he was having. He denies any associate abdominal pain with this. No nausea or vomiting. Denies any sick contacts, fevers at home, changes in diet. Denies any prior abdominal surgeries. No significant GI family history. Patient smokes a pack a day. No significant alcohol use. No prior EGD or colonoscopy. REVIEW OF SYSTEMS:    CONSTITUTIONAL: Denies any fever, chills, or rigors. Good appetite, and no recent weight loss. HEENT: No earache or tinnitus. Denies hearing loss or visual disturbances. CARDIOVASCULAR: +chest pain  RESPIRATORY: Denies any cough, hemoptysis, shortness of breath or dyspnea on exertion. GASTROINTESTINAL: As noted in the History of Present Illness. GENITOURINARY: No problems with urination. Denies any hematuria or dysuria. NEUROLOGIC: No dizziness or vertigo, denies headaches. MUSCULOSKELETAL: Denies any muscle or joint pain. SKIN: Denies skin rashes or itching.    ENDOCRINE: Denies excessive thirst. Denies intolerance to heat or cold.  PSYCHOSOCIAL: Denies depression or anxiety. Denies any recent memory loss. Historical Information   History reviewed. No pertinent past medical history. History reviewed. No pertinent surgical history. Social History   Social History     Substance and Sexual Activity   Alcohol Use Never     Social History     Substance and Sexual Activity   Drug Use Never     Social History     Tobacco Use   Smoking Status Every Day   • Packs/day: 0.50   • Years: 20.00   • Total pack years: 10.00   • Types: Cigarettes   Smokeless Tobacco Not on file     History reviewed. No pertinent family history. Meds/Allergies     No medications prior to admission. Current Facility-Administered Medications   Medication Dose Route Frequency   • acetaminophen (TYLENOL) tablet 975 mg  975 mg Oral Q8H PRN   • aspirin (ECOTRIN LOW STRENGTH) EC tablet 81 mg  81 mg Oral Daily   • atorvastatin (LIPITOR) tablet 40 mg  40 mg Oral Daily With Dinner   • heparin (porcine) 25,000 units in 0.45% NaCl 250 mL infusion (premix)  3-20 Units/kg/hr (Order-Specific) Intravenous Titrated   • heparin (porcine) injection 2,000 Units  2,000 Units Intravenous Q6H PRN   • heparin (porcine) injection 4,000 Units  4,000 Units Intravenous Q6H PRN   • nitroglycerin (NITROSTAT) SL tablet 0.4 mg  0.4 mg Sublingual Q5 Min PRN   • sodium chloride (PF) 0.9 % injection 3 mL  3 mL Intravenous Q1H PRN   • sodium chloride 0.9 % infusion  75 mL/hr Intravenous Continuous   • ticagrelor (BRILINTA) tablet 90 mg  90 mg Oral Q12H [address]       Allergies   Allergen Reactions   • Zoloft [Sertraline] Hives           Objective     Blood pressure 137/91, pulse 70, temperature 99 °F (37.2 °C), temperature source Oral, resp. rate 18, height 6' 4" (1.93 m), weight 122 kg (268 lb 8.3 oz), SpO2 98 %.     No intake or output data in the 24 hours ending 09/25/23 1127      PHYSICAL EXAM:      General Appearance:   Alert, cooperative, no distress, appears stated age    HEENT:   Normocephalic, atraumatic, anicteric. Neck:  Supple, symmetrical, trachea midline, no adenopathy. Lungs:   Clear to auscultation bilaterally; no rales, rhonchi or wheezing; respirations unlabored    Heart[de-identified]   S1 and S2 normal; regular rate and rhythm; no murmur, rub, or gallop. Abdomen:   Soft, non-tender, non-distended; normal bowel sounds; no masses, no organomegaly. Benign    Genitalia:   Deferred    Rectal:   Deferred    Extremities:  No cyanosis, clubbing or edema    Pulses:  2+ and symmetric all extremities    Skin:  Skin color, texture, turgor normal, no rashes or lesions    Lymph nodes:  No palpable cervical, axillary or inguinal lymphadenopathy        Lab Results:   Results from last 7 days   Lab Units 09/24/23  1221   WBC Thousand/uL 11.39*   HEMOGLOBIN g/dL 18.3*   HEMATOCRIT % 53.6*   PLATELETS Thousands/uL 217   NEUTROS PCT % 53   LYMPHS PCT % 34   MONOS PCT % 9   EOS PCT % 3     Results from last 7 days   Lab Units 09/24/23  1221   POTASSIUM mmol/L 4.2   CHLORIDE mmol/L 101   CO2 mmol/L 21   BUN mg/dL 16   CREATININE mg/dL 1.28   CALCIUM mg/dL 10.0   ALK PHOS U/L 104   ALT U/L 10   AST U/L 16     Results from last 7 days   Lab Units 09/24/23  1221   INR  0.89           Imaging Studies: I have personally reviewed pertinent imaging studies. XR chest 1 view portable    Result Date: 9/24/2023  Impression: No acute cardiopulmonary disease. Workstation performed: [id number]           Patient was seen and examined by Dr. Zoraida Peter. All coughlin medical decisions were made by Dr. Zoraida Peter. Thank you for allowing us to participate in the care of this present patient. We will follow-up with you closely.

## 2023-09-25 NOTE — PLAN OF CARE
Problem: PAIN - ADULT  Goal: Verbalizes/displays adequate comfort level or baseline comfort level  Description: Interventions:  - Encourage patient to monitor pain and request assistance  - Assess pain using appropriate pain scale  - Administer analgesics based on type and severity of pain and evaluate response  - Implement non-pharmacological measures as appropriate and evaluate response  - Consider cultural and social influences on pain and pain management  - Notify physician/advanced practitioner if interventions unsuccessful or patient reports new pain  Outcome: Progressing     Problem: INFECTION - ADULT  Goal: Absence or prevention of progression during hospitalization  Description: INTERVENTIONS:  - Assess and monitor for signs and symptoms of infection  - Monitor lab/diagnostic results  - Monitor all insertion sites, i.e. indwelling lines, tubes, and drains  - Monitor endotracheal if appropriate and nasal secretions for changes in amount and color  - Portia appropriate cooling/warming therapies per order  - Administer medications as ordered  - Instruct and encourage patient and family to use good hand hygiene technique  - Identify and instruct in appropriate isolation precautions for identified infection/condition  Outcome: Progressing     Problem: Knowledge Deficit  Goal: Patient/family/caregiver demonstrates understanding of disease process, treatment plan, medications, and discharge instructions  Description: Complete learning assessment and assess knowledge base.   Interventions:  - Provide teaching at level of understanding  - Provide teaching via preferred learning methods  Outcome: Progressing

## 2023-09-25 NOTE — PROGRESS NOTES
[facility]  Progress Note  Name: Evelyne Bender  MRN: [id number]  Unit/Bed#: S -01 I Date of Admission: 9/24/2023   Date of Service: 9/25/2023 I Hospital Day: 1    Assessment/Plan   * NSTEMI (non-ST elevated myocardial infarction) [facility])  Assessment & Plan  Pt with 2 days of diarrhea, nausea. Today was resting in bed when chest discomfort started, was feeling nauseous, vomited x1, continued to feel chest discomfort, left hand numbness, worsened after talking to family, began to be diaphoretic. EMS called. Received 324 ASA, and 1 dose of nitro. Elevated trop x3    Plan  Cath scheduled for today   Echo pending  Continue statin, ASA, brillinta  Tele    Coronary artery disease involving native coronary artery  Assessment & Plan  S/p Cath. LAD stent x4    Diarrhea  Assessment & Plan  Prior to arrival patient complained of 2 days of watery diarrhea with nausea and vomiting x1. Patient continues to have watery diarrhea overnight 10-15 episodes. Also noticing some blood. Patient has a history of hemorrhoids. Plan  GI consult given heparin gtt  Trend Hgb   Stool studies to r/o c diff and other bacteria   Avoid imodium.  Consider Questran if worsening diarrhea     Elevated hemoglobin (HCC)  Assessment & Plan   Most likely secondary to hemoconcentration secondary to recent diarrhea    Recent Labs     09/24/23  1221 09/25/23  1158   HGB 18.3* 16.6         Leukocytosis  Assessment & Plan  Most likely secondary to hemoconcentration versus reactive to lower GI virus   Results from last 7 days   Lab Units 09/25/23  1158 09/24/23  1221   WBC Thousand/uL 11.48* 11.39*   HEMOGLOBIN g/dL 16.6 18.3*   HEMATOCRIT % 48.1 53.6*   PLATELETS Thousands/uL 191 217   NEUTROS PCT %  --  53   LYMPHS PCT %  --  34   MONOS PCT %  --  9   EOS PCT %  --  3         Dyslipidemia  Assessment & Plan  Lab Results   Component Value Date    CHOLESTEROL 248 (H) 09/24/2023    TRIG 216 (H) 09/24/2023    HDL 42 2023    [facility] 163 (H) 2023     Started on Lipitor 40            VTE Pharmacologic Prophylaxis: VTE Score: 2 heparin gtt    Patient Centered Rounds: I performed bedside rounds with nursing staff today. Discussions with Specialists or Other Care Team Provider: Paula Quevedo    Education and Discussions with Family / Patient: Updated  (son) at bedside. Current Length of Stay: 1 day(s)  Current Patient Status: Inpatient   Discharge Plan: Anticipate discharge in 48-72 hrs to home. Code Status: Level 1 - Full Code    Subjective:   Overnight patient reports 10-15 episodes of diarrhea. Patient also noticed some blood but has a history of hemorrhoids. Patient denies chest pain, abdominal pain, shortness of breath, palpitations. Objective:     Vitals:   Temp (24hrs), Av.5 °F (36.9 °C), Min:98.1 °F (36.7 °C), Max:99 °F (37.2 °C)    Temp:  [98.1 °F (36.7 °C)-99 °F (37.2 °C)] 98.4 °F (36.9 °C)  HR:  [57-70] 57  Resp:  [18] 18  BP: (136-150)/(91-98) 150/98  SpO2:  [95 %-98 %] 97 %  Body mass index is 32.62 kg/m². Input and Output Summary (last 24 hours):   No intake or output data in the 24 hours ending 23 9045    Physical Exam:   Physical Exam  Vitals and nursing note reviewed. Constitutional:       Appearance: Normal appearance. HENT:      Head: Normocephalic and atraumatic. Cardiovascular:      Rate and Rhythm: Normal rate. Rhythm irregular. Pulses: Normal pulses. Heart sounds: Normal heart sounds. No murmur heard. No friction rub. No gallop. Pulmonary:      Effort: Pulmonary effort is normal. No respiratory distress. Breath sounds: Normal breath sounds. No wheezing or rales. Abdominal:      General: Bowel sounds are normal. There is no distension. Palpations: Abdomen is soft. Tenderness: There is no abdominal tenderness. There is no guarding. Skin:     General: Skin is warm and dry.       Capillary Refill: Capillary refill takes less than 2 seconds. Neurological:      General: No focal deficit present. Mental Status: He is alert and oriented to person, place, and time.           Additional Data:     Labs:  Results from last 7 days   Lab Units 09/25/23  1158 09/24/23  1221   WBC Thousand/uL 11.48* 11.39*   HEMOGLOBIN g/dL 16.6 18.3*   HEMATOCRIT % 48.1 53.6*   PLATELETS Thousands/uL 191 217   NEUTROS PCT %  --  53   LYMPHS PCT %  --  34   MONOS PCT %  --  9   EOS PCT %  --  3     Results from last 7 days   Lab Units 09/25/23  1158 09/24/23  1221   SODIUM mmol/L 135 135   POTASSIUM mmol/L 4.0 4.2   CHLORIDE mmol/L 107 101   CO2 mmol/L 20* 21   BUN mg/dL 15 16   CREATININE mg/dL 0.92 1.28   ANION GAP mmol/L 8 13   CALCIUM mg/dL 8.6 10.0   ALBUMIN g/dL  --  4.6   TOTAL BILIRUBIN mg/dL  --  0.78   ALK PHOS U/L  --  104   ALT U/L  --  10   AST U/L  --  16   GLUCOSE RANDOM mg/dL 106 133     Results from last 7 days   Lab Units 09/24/23  1221   INR  0.89         Results from last 7 days   Lab Units 09/24/23  1221   HEMOGLOBIN A1C % 5.7*           Lines/Drains:  Invasive Devices     Peripheral Intravenous Line  Duration           Peripheral IV 09/24/23 Left Antecubital 1 day    Peripheral IV 09/24/23 Right Antecubital 1 day                  Telemetry:  Telemetry Orders (From admission, onward)             24 Hour Telemetry Monitoring  Continuous x 24 Hours (Telem)        Question:  Reason for 24 Hour Telemetry  Answer:  PCI/EP study (including pacer and ICD implementation), Cardiac surgery, MI, abnormal cardiac cath, and chest pain- rule out MI                 Telemetry Reviewed: Normal Sinus Rhythm  Indication for Continued Telemetry Use: Acute MI/Unstable Angina/Rule out ACS             Imaging: Reviewed radiology reports from this admission including: chest xray    Recent Cultures (last 7 days):         Last 24 Hours Medication List:   Current Facility-Administered Medications   Medication Dose Route Frequency Provider Last Rate   • acetaminophen  975 mg Oral Q8H PRN Aristeo Goodman MD     • aspirin  81 mg Oral Daily Aristeo Goodman MD     • atorvastatin  40 mg Oral Daily With Dia Morgan MD     • heparin (porcine)  3-20 Units/kg/hr (Order-Specific) Intravenous Titrated Aristeo Goodman MD 17.1 Units/kg/hr (09/25/23 1253)   • heparin (porcine)  2,000 Units Intravenous Q6H PRN Aristeo Goodman MD     • heparin (porcine)  4,000 Units Intravenous Q6H PRN Aristeo Goodman MD     • nitroglycerin  0.4 mg Sublingual Q5 Min PRN Aristeo Goodman MD     • sodium chloride (PF)  3 mL Intravenous Q1H PRN Aristeo Goodman MD     • ticagrelor  90 mg Oral Q12H María Yen MD          Today, Patient Was Seen By: Aristeo Goodman MD    **Please Note: This note may have been constructed using a voice recognition system. **

## 2023-09-25 NOTE — UTILIZATION REVIEW
Initial Clinical Review    Admission: Date/Time/Statement:   Admission Orders (From admission, onward)     Ordered        09/24/23 1332  INPATIENT ADMISSION  Once                      Orders Placed This Encounter   Procedures   • INPATIENT ADMISSION     Standing Status:   Standing     Number of Occurrences:   1     Order Specific Question:   Level of Care     Answer:   Med Surg [16]     Order Specific Question:   Estimated length of stay     Answer:   More than 2 Midnights     Order Specific Question:   Certification     Answer:   I certify that inpatient services are medically necessary for this patient for a duration of greater than two midnights. See H&P and MD Progress Notes for additional information about the patient's course of treatment. ED Arrival Information     Expected   -    Arrival   9/24/2023 12:11    Acuity   Immediate            Means of arrival   Ambulance    Escorted by   [facility]    Service   Hospitalist    Admission type   Emergency            Arrival complaint   MI           Chief Complaint   Patient presents with   • Chest Pain     324 ASA given and 1 nitro PTA given by EMS       Initial Presentation: 47 y.o. male with no PMH who presents with acute chest discomfort. Patient complains of nausea, and diarrhea for the past 2 days. Today while resting in bed patient felt chest discomfort which she is never felt before, nonradiating felt nauseous and vomited X1. Patient continues to feel chest discomfort and left hand numbness which worsened after talking to family and developed diaphoresis. On route via EMS patient received 324 aspirin and 1 dose of nitro which improved his chest discomfort. Patient also explains history of whitecoat hypertension. Plan: Inpatient admission for evaluation and treatment of NSTEMI, dyslipidemia: cath planned for tomorrow, Echo pending, start statin, ASA, hold Plavix, telemetry, start Lipitor 40 mg.      Date: 9/25   Day 2:     Cardiology consult: Loaded with brilinta 180 mg PO and aspirin yesterday. Continue heparin gtt. Plan for cardiac cath today. Echo pending. Continue atorvastatin. GI consult: In the setting of concern for MI at this time with plans for catheterization today, would recommend conservative management of diarrhea and blood in stool at this time. From a GI standpoint, no indication to stop anticoagulation. Agree with stool studies. Would recommend avoiding Imodium until C. difficile study comes back. Could consider Questran in the meantime if diarrhea becomes persistent. Internal medicine: Plan for cardiac cath today. Echo pending. Continue statin, ASA, Brilinta. Telemetry. Continue heparin drip. Trend Hgb. Stool studies to r/o c diff and other bacteria. Continue Lipitor.      ED Triage Vitals   Temperature Pulse Respirations Blood Pressure SpO2   09/24/23 1220 09/24/23 1214 09/24/23 1214 09/24/23 1214 09/24/23 1214   98 °F (36.7 °C) 64 22 (!) 171/110 98 %      Temp Source Heart Rate Source Patient Position - Orthostatic VS BP Location FiO2 (%)   09/24/23 1220 09/24/23 1214 09/24/23 1214 09/24/23 1214 --   Oral Monitor Sitting Right arm       Pain Score       09/24/23 1416       No Pain          Wt Readings from Last 1 Encounters:   09/25/23 122 kg (268 lb)     Additional Vital Signs:     Date/Time Temp Pulse Resp BP MAP (mmHg) SpO2 Calculated FIO2 (%) - Nasal Cannula Nasal Cannula O2 Flow Rate (L/min) O2 Device   09/25/23 1121 -- 70 -- 137/91 -- -- -- -- --   09/25/23 07:25:29 99 °F (37.2 °C) 70 18 137/91 106 98 % -- -- None (Room air)   09/24/23 22:09:06 98.1 °F (36.7 °C) 69 18 148/98 115 95 % -- -- None (Room air)   09/24/23 14:23:03 -- 56 18 136/89 105 99 % -- -- None (Room air)   09/24/23 1345 -- 56  18  138/98  113  96 %  28  2 L/min  Nasal cannula    09/24/23 1330 -- 54 Abnormal  20 140/95 113 96 % 28 2 L/min Nasal cannula   09/24/23 1235 -- -- -- -- -- -- -- -- Nasal cannula   09/24/23 1230 -- 58 22 163/101 Abnormal  127 98 % 28 2 L/min Nasal cannula   09/24/23 1220 98 °F (36.7 °C) -- -- -- -- -- -- -- --     Pertinent Labs/Diagnostic Test Results:   XR chest 1 view portable   ED Interpretation by Suzanne Guerrero DO (09/24 1248)   No acute abnormality no infiltrate or effusion interpreted by me. Final Result by Ashwini Ibarra MD (09/24 2003)      No acute cardiopulmonary disease. Workstation performed: [id number]           9/25 Cardiac cath: Impression       •  1st Mrg lesion is 50% stenosed. •  Prox LAD lesion is 95% stenosed. •  Mid LAD lesion is 80% stenosed.     1v CAD    9/25 Echo: Interpretation Summary       •  Left Ventricle: Left ventricular cavity size is normal. Wall thickness is moderately increased. The left ventricular ejection fraction is 65%. Systolic function is normal. Diastolic function is normal.  •  The following segments are hypokinetic: apical septal and apex. •  All other segments are normal.  •  Mitral Valve: There is mild regurgitation. •  Tricuspid Valve: There is mild regurgitation. •  Aorta: The aortic root is normal in size. The ascending aorta is mildly dilated.  The ascending aorta is 4 cm.    9/24 EKG:  Normal sinus rhythm  T wave abnormality, consider anterior ischemia  Abnormal ECG  When compared with ECG of 24-SEP-2023 16:15, (unconfirmed)  Inverted T waves have replaced nonspecific T wave abnormality in Anterior leads      Results from last 7 days   Lab Units 09/25/23  1158 09/24/23  1221   WBC Thousand/uL 11.48* 11.39*   HEMOGLOBIN g/dL 16.6 18.3*   HEMATOCRIT % 48.1 53.6*   PLATELETS Thousands/uL 191 217   NEUTROS ABS Thousands/µL  --  6.10         Results from last 7 days   Lab Units 09/25/23  1158 09/24/23  1221   SODIUM mmol/L  --  135   POTASSIUM mmol/L  --  4.2   CHLORIDE mmol/L  --  101   CO2 mmol/L  --  21   ANION GAP mmol/L  --  13   BUN mg/dL  --  16   CREATININE mg/dL  --  1.28   EGFR ml/min/1.73sq m  --  63   CALCIUM mg/dL  --  10.0   MAGNESIUM mg/dL 1.9 2.1 Results from last 7 days   Lab Units 09/24/23  1221   AST U/L 16   ALT U/L 10   ALK PHOS U/L 104   TOTAL PROTEIN g/dL 8.1   ALBUMIN g/dL 4.6   TOTAL BILIRUBIN mg/dL 0.78         Results from last 7 days   Lab Units 09/24/23  1221   GLUCOSE RANDOM mg/dL 133         Results from last 7 days   Lab Units 09/24/23  1221   HEMOGLOBIN A1C % 5.7*   EAG mg/dl 117         Results from last 7 days   Lab Units 09/24/23  1723 09/24/23  1515 09/24/23  1221   HS TNI 0HR ng/L  --   --  87*   HS TNI 2HR ng/L  --  2,803*  --    HSTNI D2 ng/L  --  2,716*  --    HS TNI 4HR ng/L 7,472*  --   --    HSTNI D4 ng/L 7,385*  --   --          Results from last 7 days   Lab Units 09/25/23  1158 09/25/23  0401 09/24/23  1959 09/24/23  1221   PROTIME seconds  --   --   --  12.6   INR   --   --   --  0.89   PTT seconds 65* 59* 40* 29         ED Treatment:   Medication Administration from 09/24/2023 1211 to 09/24/2023 1409       Date/Time Order Dose Route Action     09/24/2023 1226 EDT sodium chloride 0.9 % infusion 75 mL/hr Intravenous New Bag     09/24/2023 1222 EDT metoprolol (LOPRESSOR) injection 5 mg 5 mg Intravenous Given     09/24/2023 1334 EDT heparin (porcine) injection 4,000 Units 4,000 Units Intravenous Given     09/24/2023 1334 EDT heparin (porcine) 25,000 units in 0.45% NaCl 250 mL infusion (premix) 11.1 Units/kg/hr Intravenous New Bag        History reviewed. No pertinent past medical history.   Present on Admission:  • NSTEMI (non-ST elevated myocardial infarction) ([address])  • Dyslipidemia  • Leukocytosis  • Elevated hemoglobin (HCC)      Admitting Diagnosis: Chest pain [R07.9]  Acute coronary syndrome ([address]) [I24.9]  Age/Sex: 47 y.o. male  Admission Orders:  Scheduled Medications:  aspirin, 81 mg, Oral, Daily  atorvastatin, 40 mg, Oral, Daily With Dinner  ticagrelor, 90 mg, Oral, Q12H [facility]      Continuous IV Infusions:  heparin (porcine), 3-20 Units/kg/hr (Order-Specific), Intravenous, Titrated  sodium chloride, 75 mL/hr, Intravenous, Continuous      PRN Meds:  acetaminophen, 975 mg, Oral, Q8H PRN  heparin (porcine), 2,000 Units, Intravenous, Q6H PRN  heparin (porcine), 4,000 Units, Intravenous, Q6H PRN  nitroglycerin, 0.4 mg, Sublingual, Q5 Min PRN  sodium chloride (PF), 3 mL, Intravenous, Q1H PRN        IP CONSULT TO CARDIOLOGY  IP CONSULT TO GASTROENTEROLOGY    Network Utilization Review Department  ATTENTION: Please call with any questions or concerns to [phone] and carefully listen to the prompts so that you are directed to the right person. All voicemails are confidential.  Ines Vasquez all requests for admission clinical reviews, approved or denied determinations and any other requests to dedicated fax number below belonging to the campus where the patient is receiving treatment.  List of dedicated fax numbers for the Facilities:  [place] DENIALS (Administrative/Medical Necessity) [phone] [address] [facility] (Maternity/NICU/Pediatrics) [phone]   [address] [place] [address] Drive [phone]   [facility] [address] [facility] [phone]   [id number] [place] [address] [phone]   [address] [facility] 1300 [facility] [place] [address] [phone]

## 2023-09-25 NOTE — CONSULTS
Consultation - Cardiology Team One  Loida Lewis 47 y.o. male MRN: [id number]  Unit/Bed#: S -01 Encounter: [id number]    Inpatient consult to Cardiology  Consult performed by: JOSEPH Martínez  Consult ordered by: Bhupendra Poole MD          Physician Requesting Consult: Tc Blas*  Reason for Consult / Principal Problem: NSTEMI     Assessment/ Plan    1. Chest pain with elevated troponin concerning for Type I MI   HS troponin 87--> 2,803-->7,472  Reviewed ECGs showing NSR with ST abnormalities in inferior leads  Continue heparin gtt   Loaded with brilinta 180 mg PO and aspirin yesterday. On brilinta 90 mg PO BID, aspirin, atorvastatin and  Metoprolol   Plan for cardiac cath today   Echocardiogram pending   Reviewed lipid panel  Check hemoglobin A1C     2. Hyperlipidemia   [id number]   Total cholesterol 248  Triglycerides 216  On atorvastatin 40 mg PO daily     3. Tobacco abuse  Counseled on smoking cessation     4. Diarrhea   Followed by primary team       History of Present Illness   HPI: Loida Lewis is a 47y.o. year old male who has no significant PMH. He does not follow with a  Cardiologist.            He presents to [facility] ER 9/24/2023 via EMS with complaint of chest pain. Patient reports yesterday he had several episodes of loose stools and went to rest in bed. While in bed, he started having tightness in his chest. Pain lasted for about 20 minutes and his daughter called EMS. He reports diaphoresis, increased work of breathing and numbness down his arms with chest tightness. When EMS arrived, he was able to walk to the ambulance and reports he felt better once he was in the cool air. But then chest tightness started again when he got in the ambulance. He was given SL nitro and aspirin 324 mg PO and reports chest pain resolved within a couple minutes. No further complaint of chest pain. HS troponin  87--> 2,803-->7,472 and cardiology was consulted. He lives at home independently. 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 He smokes 1/2- 3/4 ppd. He reports no family history of heart disease. He is sedentary at home but reports he did power wash his house a couple weeks ago and reports no exertional chest pain or SOB. EKG reviewed personally:  Normal sinus rhythm with ST abnormalities in inferior leads  Ventricular rate 62 bpm  NH interval 164 ms  QRSD 82 ms  QT interval 418 ms  QTc interval 424 ms     Telemetry reviewed personally:   Normal sinus rhythm with occasional PVCs and NSVT     Review of Systems   Constitutional: Negative for chills and fever. HENT: Negative for congestion. Cardiovascular: Negative for chest pain, dyspnea on exertion, leg swelling, orthopnea and palpitations. Respiratory: Negative for shortness of breath. Musculoskeletal: Negative for falls. Gastrointestinal: Positive for diarrhea. Negative for bloating. Neurological: Negative for dizziness and light-headedness. Psychiatric/Behavioral: Negative for altered mental status. All other systems reviewed and are negative. Historical Information   History reviewed. No pertinent past medical history. History reviewed. No pertinent surgical history. Social History     Substance and Sexual Activity   Alcohol Use Never     Social History     Substance and Sexual Activity   Drug Use Never     Social History     Tobacco Use   Smoking Status Every Day   • Packs/day: 0.50   • Years: 20.00   • Total pack years: 10.00   • Types: Cigarettes   Smokeless Tobacco Not on file     Family History: History reviewed. No pertinent family history.     Meds/Allergies   all current active meds have been reviewed and current meds:   Current Facility-Administered Medications   Medication Dose Route Frequency   • acetaminophen (TYLENOL) tablet 975 mg  975 mg Oral Q8H PRN   • aspirin (ECOTRIN LOW STRENGTH) EC tablet 81 mg  81 mg Oral Daily   • atorvastatin (LIPITOR) tablet 40 mg  40 mg Oral Daily With Dinner   • heparin (porcine) 25,000 units in 0.45% NaCl 250 mL infusion (premix)  3-20 Units/kg/hr (Order-Specific) Intravenous Titrated   • heparin (porcine) injection 2,000 Units  2,000 Units Intravenous Q6H PRN   • heparin (porcine) injection 4,000 Units  4,000 Units Intravenous Q6H PRN   • nitroglycerin (NITROSTAT) SL tablet 0.4 mg  0.4 mg Sublingual Q5 Min PRN   • sodium chloride (PF) 0.9 % injection 3 mL  3 mL Intravenous Q1H PRN   • sodium chloride 0.9 % infusion  75 mL/hr Intravenous Continuous   • ticagrelor (BRILINTA) tablet 90 mg  90 mg Oral Q12H [facility]     heparin (porcine), 3-20 Units/kg/hr (Order-Specific), Last Rate: 17.1 Units/kg/hr (09/25/23 0540)  sodium chloride, 75 mL/hr, Last Rate: 75 mL/hr (09/24/23 1226)        Allergies   Allergen Reactions   • Zoloft [Sertraline] Hives       Objective   Vitals: Blood pressure 137/91, pulse 70, temperature 99 °F (37.2 °C), temperature source Oral, resp. rate 18, height 6' 4" (1.93 m), weight 122 kg (268 lb 8.3 oz), SpO2 98 %. ,     Body mass index is 32.69 kg/m². ,     Systolic (39XNE), SONJA:815 , Min:136 , OAW:836     Diastolic (38RNF), IIK:32, Min:89, Max:110    No intake or output data in the 24 hours ending 09/25/23 0848  Weight (last 2 days)     Date/Time Weight    09/24/23 1214 122 (268.52)        Invasive Devices     Peripheral Intravenous Line  Duration           Peripheral IV 09/24/23 Left Antecubital 1 day    Peripheral IV 09/24/23 Right Antecubital <1 day              Physical Exam  Constitutional:       General: He is not in acute distress. HENT:      Head: Normocephalic. Mouth/Throat:      Mouth: Mucous membranes are moist.   Cardiovascular:      Rate and Rhythm: Normal rate and regular rhythm. Pulses: Normal pulses. Pulmonary:      Effort: Pulmonary effort is normal. No respiratory distress. Comments: RA  Course   Abdominal:      General: Bowel sounds are normal.      Palpations: Abdomen is soft. Musculoskeletal:         General: No swelling. Normal range of motion. Cervical back: Neck supple. Skin:     General: Skin is warm and dry. Capillary Refill: Capillary refill takes less than 2 seconds. Neurological:      Mental Status: He is alert and oriented to person, place, and time. Psychiatric:         Mood and Affect: Mood normal.           LABORATORY RESULTS:      CBC with diff:   Results from last 7 days   Lab Units 09/24/23  1221   WBC Thousand/uL 11.39*   HEMOGLOBIN g/dL 18.3*   HEMATOCRIT % 53.6*   MCV fL 93   PLATELETS Thousands/uL 217   RBC Million/uL 5.79*   MCH pg 31.6   MCHC g/dL 34.1   RDW % 13.2   MPV fL 9.6   NRBC AUTO /100 WBCs 0       CMP:  Results from last 7 days   Lab Units 09/24/23  1221   POTASSIUM mmol/L 4.2   CHLORIDE mmol/L 101   CO2 mmol/L 21   BUN mg/dL 16   CREATININE mg/dL 1.28   CALCIUM mg/dL 10.0   AST U/L 16   ALT U/L 10   ALK PHOS U/L 104   EGFR ml/min/1.73sq m 63       BMP:  Results from last 7 days   Lab Units 09/24/23  1221   POTASSIUM mmol/L 4.2   CHLORIDE mmol/L 101   CO2 mmol/L 21   BUN mg/dL 16   CREATININE mg/dL 1.28   CALCIUM mg/dL 10.0        Results from last 7 days   Lab Units 09/24/23  1221   MAGNESIUM mg/dL 2.1       Results from last 7 days   Lab Units 09/24/23  1221   INR  0.89     Lipid Profile:   No results found for: "CHOL"  Lab Results   Component Value Date    HDL 42 09/24/2023     Lab Results   Component Value Date    LDLCALC 163 (H) 09/24/2023     Lab Results   Component Value Date    TRIG 216 (H) 09/24/2023         Cardiac testing:   No results found for this or any previous visit. No results found for this or any previous visit. No valid procedures specified. No results found for this or any previous visit. Imaging:   XR chest 1 view portable    Result Date: 9/24/2023  Narrative: CHEST INDICATION:   MI. COMPARISON:  None EXAM PERFORMED/VIEWS:  XR CHEST PORTABLE FINDINGS: Cardiomediastinal silhouette appears unremarkable. The lungs are clear. No pneumothorax or pleural effusion.  Osseous structures appear within normal limits for patient age. Impression: No acute cardiopulmonary disease. Workstation performed: [id number]     Thank you for allowing us to participate in this patient's care. Counseling / Coordination of Care  Total floor / unit time spent today 45 minutes. Greater than 50% of total time was spent with the patient and / or family counseling and / or coordination of care. A description of the counseling / coordination of care: Review of history, current assessment, development of a plan. Code Status: Level 1 - Full Code    ** Please Note: Dragon 360 Dictation voice to text software may have been used in the creation of this document.  **

## 2023-09-25 NOTE — DISCHARGE INSTR - AVS FIRST PAGE
1. Please see the post angioplasty discharge instructions. No heavy lifting, greater than 10 lbs. or strenuous activity for 5 days. Follow angioplasty discharge instructions. 2.Remove band aid tomorrow. Shower and wash area- wrist gently with soap and water- beginning tomorrow. Rinse and pat dry. Apply new water seal band aid. Repeat this process for 5 days. No powders, creams lotions or antibiotic ointments  for 5 days. No tub baths, hot tubs or swimming for 5 days. 3. Call [facility] Cardiology Office ([phone]) if you develop a fever, redness or drainage at your wrist access site. 4. No driving for 2 days    5. Do not stop aspirin or Brilinta (Ticagrelor) any reason without a cardiologist’s consent, or the stent could block up and cause a heart attack. 6. Stent card and book. Dear Cheli Myers,     It was our pleasure to care for you here at [facility], [facility]. It is our hope that we were always able to exceed the expected standards for your care during your stay. You were hospitalized due to NSTEMI. You were cared for on the 3rd floor by Karina Saldana MD under the service of Yoly Delgado MD with the [facility] Hospitalist Group who covers for your primary care physician (PCP), No primary care provider on file. , while you were hospitalized. If you have any questions or concerns related to this hospitalization, you may contact us at [phone]. For follow up as well as any medication refills, we recommend that you follow up with your primary care physician. A registered nurse will reach out to you by phone within a few days after your discharge to answer any additional questions that you may have after going home. However, at this time we provide for you here, the most important instructions / recommendations at discharge:     Notable Medication Adjustments -   Start taking an 81 mg daily. Start taking atorvastatin 40 mg daily.     Start taking with metoprolol tartrate once in the morning and once in the evening. Start taking Ticagrelor (Brilinta) once in the morning and once in the evening. Take nitroglycerin as needed for chest pain the tongue every 5 minutes. Testing Required after Discharge -   none  Important follow up information -   Please follow up with the cardiologist within 2 weeks of discharge. Please call the clinic listed in the discharge instruction to establish a PCP. Other Instructions -   None  Please review this entire after visit summary as additional general instructions including medication list, appointments, activity, diet, any pertinent wound care, and other additional recommendations from your care team that may be provided for you.       Sincerely,     Madison Russell MD

## 2023-09-26 VITALS
DIASTOLIC BLOOD PRESSURE: 74 MMHG | WEIGHT: 268 LBS | SYSTOLIC BLOOD PRESSURE: 109 MMHG | BODY MASS INDEX: 32.63 KG/M2 | TEMPERATURE: 98.7 F | HEART RATE: 78 BPM | RESPIRATION RATE: 18 BRPM | OXYGEN SATURATION: 93 % | HEIGHT: 76 IN

## 2023-09-26 PROBLEM — D58.2 ELEVATED HEMOGLOBIN (HCC): Status: RESOLVED | Noted: 2023-09-25 | Resolved: 2023-09-26

## 2023-09-26 LAB
ANION GAP SERPL CALCULATED.3IONS-SCNC: 10 MMOL/L
ANION GAP SERPL CALCULATED.3IONS-SCNC: 7 MMOL/L
BASOPHILS # BLD AUTO: 0.05 THOUSANDS/ÂΜL (ref 0–0.1)
BASOPHILS NFR BLD AUTO: 0 % (ref 0–1)
BUN SERPL-MCNC: 13 MG/DL (ref 5–25)
BUN SERPL-MCNC: 13 MG/DL (ref 5–25)
C DIFF TOX GENS STL QL NAA+PROBE: NEGATIVE
CALCIUM SERPL-MCNC: 8.5 MG/DL (ref 8.4–10.2)
CALCIUM SERPL-MCNC: 8.8 MG/DL (ref 8.4–10.2)
CAMPYLOBACTER DNA SPEC NAA+PROBE: NORMAL
CHLORIDE SERPL-SCNC: 104 MMOL/L (ref 96–108)
CHLORIDE SERPL-SCNC: 104 MMOL/L (ref 96–108)
CO2 SERPL-SCNC: 21 MMOL/L (ref 21–32)
CO2 SERPL-SCNC: 22 MMOL/L (ref 21–32)
CREAT SERPL-MCNC: 0.99 MG/DL (ref 0.6–1.3)
CREAT SERPL-MCNC: 1.03 MG/DL (ref 0.6–1.3)
EOSINOPHIL # BLD AUTO: 0.16 THOUSAND/ÂΜL (ref 0–0.61)
EOSINOPHIL NFR BLD AUTO: 1 % (ref 0–6)
ERYTHROCYTE [DISTWIDTH] IN BLOOD BY AUTOMATED COUNT: 13.2 % (ref 11.6–15.1)
ERYTHROCYTE [DISTWIDTH] IN BLOOD BY AUTOMATED COUNT: 13.3 % (ref 11.6–15.1)
GFR SERPL CREATININE-BSD FRML MDRD: 83 ML/MIN/1.73SQ M
GFR SERPL CREATININE-BSD FRML MDRD: 87 ML/MIN/1.73SQ M
GLUCOSE SERPL-MCNC: 121 MG/DL (ref 65–140)
GLUCOSE SERPL-MCNC: 149 MG/DL (ref 65–140)
HCT VFR BLD AUTO: 45.5 % (ref 36.5–49.3)
HCT VFR BLD AUTO: 45.9 % (ref 36.5–49.3)
HGB BLD-MCNC: 15.8 G/DL (ref 12–17)
HGB BLD-MCNC: 15.9 G/DL (ref 12–17)
IMM GRANULOCYTES # BLD AUTO: 0.09 THOUSAND/UL (ref 0–0.2)
IMM GRANULOCYTES NFR BLD AUTO: 1 % (ref 0–2)
LYMPHOCYTES # BLD AUTO: 2.12 THOUSANDS/ÂΜL (ref 0.6–4.47)
LYMPHOCYTES NFR BLD AUTO: 17 % (ref 14–44)
MAGNESIUM SERPL-MCNC: 1.7 MG/DL (ref 1.9–2.7)
MCH RBC QN AUTO: 31.7 PG (ref 26.8–34.3)
MCH RBC QN AUTO: 32.1 PG (ref 26.8–34.3)
MCHC RBC AUTO-ENTMCNC: 34.4 G/DL (ref 31.4–37.4)
MCHC RBC AUTO-ENTMCNC: 34.9 G/DL (ref 31.4–37.4)
MCV RBC AUTO: 92 FL (ref 82–98)
MCV RBC AUTO: 92 FL (ref 82–98)
MONOCYTES # BLD AUTO: 1.05 THOUSAND/ÂΜL (ref 0.17–1.22)
MONOCYTES NFR BLD AUTO: 9 % (ref 4–12)
NEUTROPHILS # BLD AUTO: 8.8 THOUSANDS/ÂΜL (ref 1.85–7.62)
NEUTS SEG NFR BLD AUTO: 72 % (ref 43–75)
NRBC BLD AUTO-RTO: 0 /100 WBCS
PLATELET # BLD AUTO: 177 THOUSANDS/UL (ref 149–390)
PLATELET # BLD AUTO: 189 THOUSANDS/UL (ref 149–390)
PMV BLD AUTO: 9.3 FL (ref 8.9–12.7)
PMV BLD AUTO: 9.6 FL (ref 8.9–12.7)
POTASSIUM SERPL-SCNC: 3.5 MMOL/L (ref 3.5–5.3)
POTASSIUM SERPL-SCNC: 3.8 MMOL/L (ref 3.5–5.3)
RBC # BLD AUTO: 4.95 MILLION/UL (ref 3.88–5.62)
RBC # BLD AUTO: 4.98 MILLION/UL (ref 3.88–5.62)
SALMONELLA DNA SPEC QL NAA+PROBE: NORMAL
SHIGA TOXIN STX GENE SPEC NAA+PROBE: NORMAL
SHIGELLA DNA SPEC QL NAA+PROBE: NORMAL
SODIUM SERPL-SCNC: 133 MMOL/L (ref 135–147)
SODIUM SERPL-SCNC: 135 MMOL/L (ref 135–147)
WBC # BLD AUTO: 11.98 THOUSAND/UL (ref 4.31–10.16)
WBC # BLD AUTO: 12.27 THOUSAND/UL (ref 4.31–10.16)

## 2023-09-26 PROCEDURE — 99232 SBSQ HOSP IP/OBS MODERATE 35: CPT | Performed by: INTERNAL MEDICINE

## 2023-09-26 PROCEDURE — 80048 BASIC METABOLIC PNL TOTAL CA: CPT

## 2023-09-26 PROCEDURE — 85025 COMPLETE CBC W/AUTO DIFF WBC: CPT | Performed by: INTERNAL MEDICINE

## 2023-09-26 PROCEDURE — 99238 HOSP IP/OBS DSCHRG MGMT 30/<: CPT | Performed by: HOSPITALIST

## 2023-09-26 PROCEDURE — 83735 ASSAY OF MAGNESIUM: CPT

## 2023-09-26 PROCEDURE — 85027 COMPLETE CBC AUTOMATED: CPT | Performed by: INTERNAL MEDICINE

## 2023-09-26 PROCEDURE — 80048 BASIC METABOLIC PNL TOTAL CA: CPT | Performed by: INTERNAL MEDICINE

## 2023-09-26 RX ORDER — MAGNESIUM SULFATE HEPTAHYDRATE 40 MG/ML
2 INJECTION, SOLUTION INTRAVENOUS ONCE
Status: COMPLETED | OUTPATIENT
Start: 2023-09-26 | End: 2023-09-26

## 2023-09-26 RX ORDER — SODIUM CHLORIDE 9 MG/ML
125 INJECTION, SOLUTION INTRAVENOUS CONTINUOUS
Status: DISCONTINUED | OUTPATIENT
Start: 2023-09-26 | End: 2023-09-26

## 2023-09-26 RX ORDER — ACETAMINOPHEN 325 MG/1
975 TABLET ORAL EVERY 8 HOURS PRN
Refills: 0
Start: 2023-09-26

## 2023-09-26 RX ORDER — ONDANSETRON 2 MG/ML
4 INJECTION INTRAMUSCULAR; INTRAVENOUS EVERY 6 HOURS PRN
Status: DISCONTINUED | OUTPATIENT
Start: 2023-09-26 | End: 2023-09-26 | Stop reason: HOSPADM

## 2023-09-26 RX ORDER — ONDANSETRON 2 MG/ML
4 INJECTION INTRAMUSCULAR; INTRAVENOUS EVERY 6 HOURS PRN
Refills: 0 | Status: CANCELLED | OUTPATIENT
Start: 2023-09-26

## 2023-09-26 RX ORDER — NITROGLYCERIN 0.4 MG/1
0.4 TABLET SUBLINGUAL
Qty: 90 TABLET | Refills: 0 | Status: SHIPPED | OUTPATIENT
Start: 2023-09-26

## 2023-09-26 RX ORDER — ATORVASTATIN CALCIUM 40 MG/1
40 TABLET, FILM COATED ORAL
Qty: 30 TABLET | Refills: 0 | Status: SHIPPED | OUTPATIENT
Start: 2023-09-26 | End: 2023-10-04 | Stop reason: SDUPTHER

## 2023-09-26 RX ADMIN — METOPROLOL TARTRATE 25 MG: 25 TABLET, FILM COATED ORAL at 09:55

## 2023-09-26 RX ADMIN — MAGNESIUM SULFATE HEPTAHYDRATE 2 G: 40 INJECTION, SOLUTION INTRAVENOUS at 07:59

## 2023-09-26 RX ADMIN — TICAGRELOR 90 MG: 90 TABLET ORAL at 08:04

## 2023-09-26 RX ADMIN — ASPIRIN 81 MG: 81 TABLET ORAL at 08:04

## 2023-09-26 NOTE — ASSESSMENT & PLAN NOTE
Most likely secondary to hemoconcentration versus reactive to lower GI virus   Results from last 7 days   Lab Units 09/26/23  0906 09/26/23  0441 09/25/23  1158 09/24/23  1221   WBC Thousand/uL 11.98* 12.27* 11.48* 11.39*   HEMOGLOBIN g/dL 15.9 15.8 16.6 18.3*   HEMATOCRIT % 45.5 45.9 48.1 53.6*   PLATELETS Thousands/uL 177 189 191 217   NEUTROS PCT %  --  72  --  53   LYMPHS PCT %  --  17  --  34   MONOS PCT %  --  9  --  9   EOS PCT %  --  1  --  3

## 2023-09-26 NOTE — PLAN OF CARE
Problem: PAIN - ADULT  Goal: Verbalizes/displays adequate comfort level or baseline comfort level  Description: Interventions:  - Encourage patient to monitor pain and request assistance  - Assess pain using appropriate pain scale  - Administer analgesics based on type and severity of pain and evaluate response  - Implement non-pharmacological measures as appropriate and evaluate response  - Consider cultural and social influences on pain and pain management  - Notify physician/advanced practitioner if interventions unsuccessful or patient reports new pain  Outcome: Progressing     Problem: INFECTION - ADULT  Goal: Absence or prevention of progression during hospitalization  Description: INTERVENTIONS:  - Assess and monitor for signs and symptoms of infection  - Monitor lab/diagnostic results  - Monitor all insertion sites, i.e. indwelling lines, tubes, and drains  - Monitor endotracheal if appropriate and nasal secretions for changes in amount and color  - [place] appropriate cooling/warming therapies per order  - Administer medications as ordered  - Instruct and encourage patient and family to use good hand hygiene technique  - Identify and instruct in appropriate isolation precautions for identified infection/condition  Outcome: Progressing  Goal: Absence of fever/infection during neutropenic period  Description: INTERVENTIONS:  - Monitor WBC    Outcome: Progressing     Problem: Knowledge Deficit  Goal: Patient/family/caregiver demonstrates understanding of disease process, treatment plan, medications, and discharge instructions  Description: Complete learning assessment and assess knowledge base.   Interventions:  - Provide teaching at level of understanding  - Provide teaching via preferred learning methods  Outcome: Progressing     Problem: SAFETY ADULT  Goal: Patient will remain free of falls  Description: INTERVENTIONS:  - Educate patient/family on patient safety including physical limitations  - Instruct patient to call for assistance with activity   - Consult OT/PT to assist with strengthening/mobility   - Keep Call bell within reach  - Keep bed low and locked with side rails adjusted as appropriate  - Keep care items and personal belongings within reach  - Initiate and maintain comfort rounds  - Make Fall Risk Sign visible to staff  - Offer Toileting every 3 Hours, in advance of need  - Apply yellow socks and bracelet for high fall risk patients  - Consider moving patient to room near nurses station  Outcome: Progressing  Goal: Maintain or return to baseline ADL function  Description: INTERVENTIONS:  -  Assess patient's ability to carry out ADLs; assess patient's baseline for ADL function and identify physical deficits which impact ability to perform ADLs (bathing, care of mouth/teeth, toileting, grooming, dressing, etc.)  - Assess/evaluate cause of self-care deficits   - Assess range of motion  - Assess patient's mobility; develop plan if impaired  - Assess patient's need for assistive devices and provide as appropriate  - Encourage maximum independence but intervene and supervise when necessary  - Involve family in performance of ADLs  - Assess for home care needs following discharge   - Consider OT consult to assist with ADL evaluation and planning for discharge  - Provide patient education as appropriate  Outcome: Progressing  Goal: Maintains/Returns to pre admission functional level  Description: INTERVENTIONS:  - Perform BMAT or MOVE assessment daily.   - Set and communicate daily mobility goal to care team and patient/family/caregiver. - Collaborate with rehabilitation services on mobility goals if consulted  - Perform Range of Motion 3 times a day. - Reposition patient every 2 hours.   - Dangle patient 3 times a day  - Stand patient 3 times a day  - Ambulate patient 3 times a day  - Out of bed to chair 3 times a day   - Out of bed for meals 3 times a day  - Out of bed for toileting  - Record patient progress and toleration of activity level   Outcome: Progressing     Problem: DISCHARGE PLANNING  Goal: Discharge to home or other facility with appropriate resources  Description: INTERVENTIONS:  - Identify barriers to discharge w/patient and caregiver  - Arrange for needed discharge resources and transportation as appropriate  - Identify discharge learning needs (meds, wound care, etc.)  - Arrange for interpretive services to assist at discharge as needed  - Refer to Case Management Department for coordinating discharge planning if the patient needs post-hospital services based on physician/advanced practitioner order or complex needs related to functional status, cognitive ability, or social support system  Outcome: Progressing     Problem: Nutrition/Hydration-ADULT  Goal: Nutrient/Hydration intake appropriate for improving, restoring or maintaining nutritional needs  Description: Monitor and assess patient's nutrition/hydration status for malnutrition. Collaborate with interdisciplinary team and initiate plan and interventions as ordered. Monitor patient's weight and dietary intake as ordered or per policy. Utilize nutrition screening tool and intervene as necessary. Determine patient's food preferences and provide high-protein, high-caloric foods as appropriate.      INTERVENTIONS:  - Monitor oral intake, urinary output, labs, and treatment plans  - Assess nutrition and hydration status and recommend course of action  - Evaluate amount of meals eaten  - Assist patient with eating if necessary   - Allow adequate time for meals  - Recommend/ encourage appropriate diets, oral nutritional supplements, and vitamin/mineral supplements  - Order, calculate, and assess calorie counts as needed  - Recommend, monitor, and adjust tube feedings and TPN/PPN based on assessed needs  - Assess need for intravenous fluids  - Provide specific nutrition/hydration education as appropriate  - Include patient/family/caregiver in decisions related to nutrition  Outcome: Progressing

## 2023-09-26 NOTE — ASSESSMENT & PLAN NOTE
Prior to arrival patient complained of 2 days of watery diarrhea with nausea and vomiting x1. Patient continues to have watery diarrhea overnight 10-15 episodes. Also noticing some blood. Patient has a history of hemorrhoids.     Stool studies negative

## 2023-09-26 NOTE — UTILIZATION REVIEW
NOTIFICATION OF INPATIENT ADMISSION   AUTHORIZATION REQUEST   SERVICING FACILITY:   [address] [place] [address]  Tax ID: [id number]  NPI: [id number]   ATTENDING PROVIDER:  Attending Name and NPI#: Char Ragsdale Md [[id number]]  Address: [address] [place] [address]  Phone: [phone]     ADMISSION INFORMATION:  Place of Service: Inpatient [address]  Place of Service Code: 21  Inpatient Admission Date/Time: 9/24/23  1:32 PM  Discharge Date/Time: No discharge date for patient encounter. Admitting Diagnosis Code/Description:  Chest pain [R07.9]  Acute coronary syndrome ([address]) [I24.9]     UTILIZATION REVIEW CONTACT:  Mary Capone Utilization   Network Utilization Review Department  Phone: [phone]  Fax: [phone]  Email: [email or web address]  Contact for approvals/pending authorizations, clinical reviews, and discharge. PHYSICIAN ADVISORY SERVICES:  Medical Necessity Denial & Jten-vt-Lyjh Review  Phone: [phone]  Fax: [phone]  Email: [email or web address]

## 2023-09-26 NOTE — ASSESSMENT & PLAN NOTE
Most likely secondary to hemoconcentration secondary to recent diarrhea    Recent Labs     09/25/23  1158 09/26/23  0441 09/26/23  0906   HGB 16.6 15.8 15.9

## 2023-09-26 NOTE — PROGRESS NOTES
General Cardiology   Progress Note -  Team One   Daron Cerrato 47 y.o. male MRN: [id number]    Unit/Bed#: S -01 Encounter: [id number]    Assessment/ Plan    1. NSTEMI   HS troponin 87--> 2,803-->7,472  Reviewed ECGs showing NSR with ST abnormalities  S/p cardiac cath showing OM1 50% stenosis, proximal LAD 95% stenosis and mid LAD 80% stenosis s/p DAMARIS to proximal and mid LAD   On brilinta 90 mg PO BID, aspirin, atorvastatin and  Metoprolol   Echocardiogram showed EF 65%, hypokinetic apical septal and apex, mild MR and mild TR   Reviewed lipid panel  hemoglobin A1C 5.7  Reviewed post discharge instructions with patient in length   Recommend cardiac rehab   Scheduled to follow up with Timothy RUIZ 10/4/2023     2. Hyperlipidemia   [id number]   Total cholesterol 248  Triglycerides 216  On atorvastatin 40 mg PO daily      3. Tobacco abuse  Counseled on smoking cessation      4. Diarrhea   Followed by primary team   C diff culture pending     5. Pre diabetic   Hemoglobin A1C 5.7  Followed by primary team      Subjective  Patient resting in bed. He offers no complaint of chest pain, SOB or palpitations. Review of Systems   Constitutional: Negative for chills, decreased appetite, fever and malaise/fatigue. Cardiovascular: Negative for chest pain, dyspnea on exertion, leg swelling, orthopnea and palpitations. Respiratory: Negative for shortness of breath. Musculoskeletal: Negative for falls. Gastrointestinal: Negative for bloating, nausea and vomiting. Psychiatric/Behavioral: Negative for altered mental status. All other systems reviewed and are negative. Objective:   Vitals: Blood pressure 109/74, pulse 78, temperature 98.7 °F (37.1 °C), resp. rate 18, height 6' 4" (1.93 m), weight 122 kg (268 lb), SpO2 93 %. ,       Body mass index is 32.62 kg/m². ,     Systolic (58ZID), OJD:037 , Min:109 , OII:110     Diastolic (36STJ), NNO:79, Min:72, Max:98      Intake/Output Summary (Last 24 hours) at 9/26/2023 0198  Last data filed at 9/25/2023 2100  Gross per 24 hour   Intake 0 ml   Output --   Net 0 ml     Weight (last 2 days)     Date/Time Weight    09/25/23 1121 122 (268)    09/24/23 1214 122 (268.52)        Telemetry Review: Normal sinus rhythm     Physical Exam  Constitutional:       General: He is not in acute distress. HENT:      Head: Normocephalic. Mouth/Throat:      Mouth: Mucous membranes are moist.   Cardiovascular:      Rate and Rhythm: Normal rate and regular rhythm. Pulses: Normal pulses. Pulmonary:      Effort: Pulmonary effort is normal. No respiratory distress. Breath sounds: Normal breath sounds. Abdominal:      General: Bowel sounds are normal.      Palpations: Abdomen is soft. Musculoskeletal:         General: No swelling. Normal range of motion. Cervical back: Neck supple. Skin:     General: Skin is warm and dry. Capillary Refill: Capillary refill takes less than 2 seconds. Comments: R wrist dsg CDI. No hematoma or bleeding    Neurological:      Mental Status: He is alert and oriented to person, place, and time.    Psychiatric:         Mood and Affect: Mood normal.         LABORATORY RESULTS      CBC with diff:   Results from last 7 days   Lab Units 09/26/23 0441 09/25/23  1158 09/24/23  1221   WBC Thousand/uL 12.27* 11.48* 11.39*   HEMOGLOBIN g/dL 15.8 16.6 18.3*   HEMATOCRIT % 45.9 48.1 53.6*   MCV fL 92 93 93   PLATELETS Thousands/uL 189 191 217   RBC Million/uL 4.98 5.17 5.79*   MCH pg 31.7 32.1 31.6   MCHC g/dL 34.4 34.5 34.1   RDW % 13.2 13.2 13.2   MPV fL 9.3 9.3 9.6   NRBC AUTO /100 WBCs 0  --  0       CMP:  Results from last 7 days   Lab Units 09/26/23  0441 09/25/23  1158 09/24/23  1221   POTASSIUM mmol/L 3.5 4.0 4.2   CHLORIDE mmol/L 104 107 101   CO2 mmol/L 22 20* 21   BUN mg/dL 13 15 16   CREATININE mg/dL 1.03 0.92 1.28   CALCIUM mg/dL 8.5 8.6 10.0   AST U/L  --   --  16   ALT U/L  --   --  10   ALK PHOS U/L  --   --  104   EGFR ml/min/1.73sq m 83 95 63       BMP:  Results from last 7 days   Lab Units 09/26/23  0441 09/25/23  1158 09/24/23  1221   POTASSIUM mmol/L 3.5 4.0 4.2   CHLORIDE mmol/L 104 107 101   CO2 mmol/L 22 20* 21   BUN mg/dL 13 15 16   CREATININE mg/dL 1.03 0.92 1.28   CALCIUM mg/dL 8.5 8.6 10.0       No results found for: "NTBNP"          Results from last 7 days   Lab Units 09/26/23  0441 09/25/23  1158 09/24/23  1221   MAGNESIUM mg/dL 1.7* 1.9 2.1          Results from last 7 days   Lab Units 09/25/23  1355 09/24/23  1221   HEMOGLOBIN A1C % 5.7* 5.7*              Results from last 7 days   Lab Units 09/24/23  1221   INR  0.89       Lipid Profile:   No results found for: "CHOL"  Lab Results   Component Value Date    HDL 42 09/24/2023     Lab Results   Component Value Date    LDLCALC 163 (H) 09/24/2023     Lab Results   Component Value Date    TRIG 216 (H) 09/24/2023       Cardiac testing:   No results found for this or any previous visit. No results found for this or any previous visit. No results found for this or any previous visit. No valid procedures specified. No results found for this or any previous visit. Meds/Allergies   all current active meds have been reviewed and current meds:   Current Facility-Administered Medications   Medication Dose Route Frequency   • acetaminophen (TYLENOL) tablet 975 mg  975 mg Oral Q8H PRN   • aspirin (ECOTRIN LOW STRENGTH) EC tablet 81 mg  81 mg Oral Daily   • atorvastatin (LIPITOR) tablet 40 mg  40 mg Oral Daily With Dinner   • magnesium sulfate 2 g/50 mL IVPB (premix) 2 g  2 g Intravenous Once   • nitroglycerin (NITROSTAT) SL tablet 0.4 mg  0.4 mg Sublingual Q5 Min PRN   • ondansetron (ZOFRAN) injection 4 mg  4 mg Intravenous Q6H PRN   • sodium chloride (PF) 0.9 % injection 3 mL  3 mL Intravenous Q1H PRN   • sodium chloride 0.9 % infusion  125 mL/hr Intravenous Continuous   • ticagrelor (BRILINTA) tablet 90 mg  90 mg Oral Q12H [address]     No medications prior to admission. sodium chloride, 125 mL/hr, Last Rate: Stopped (09/26/23 0751)      Counseling / Coordination of Care  Total floor / unit time spent today 20 minutes. Greater than 50% of total time was spent with the patient and / or family counseling and / or coordination of care. ** Please Note: Dragon 360 Dictation voice to text software may have been used in the creation of this document.  **

## 2023-09-26 NOTE — CASE MANAGEMENT
Case Management Progress Note    Patient name Cheli Myers  Location S /S -01 MRN [id number]  : 1970 Date 2023       LOS (days): 2  Geometric Mean LOS (GMLOS) (days): 2.40  Days to GMLOS:0.3        OBJECTIVE:        Current admission status: Inpatient  Preferred Pharmacy:   UNKNOWN - FOLLOW UP PRIOR TO DISCHARGE TO E-PRESCRIBE  No address on file      [id number] [place] - [address]. [address] [facility] [address]  Phone: [phone] Fax: [phone]    Primary Care Provider: No primary care provider on file. Primary Insurance: [employer]  Secondary Insurance:     PROGRESS NOTE:    TT received from MD requesting price check for Brilinta. Call made to [facility] pharmacy and pharmacist reports that order has not yet been received/processed and that they are currently behind with orders coming in. Requesting return call in an hour to check cost, although did relay that they have the medication in stock. TT sent to MD to relay above. Will try to reach out again as able, but provided patient with 30-day free trial card as well as $5 savings card as patient has commercial insurance. Patient aware of anticipated d/c for today and accepted coupons for free-trial and $5 savings for Brilinta. No other d/c needs identified at this time.

## 2023-09-26 NOTE — DISCHARGE SUMMARY
[facility]  Discharge- Loida Hoop 1970, 47 y.o. male MRN: [id number]  Unit/Bed#: S -01 Encounter: [id number]  Primary Care Provider: No primary care provider on file. Date and time admitted to hospital: 9/24/2023 12:11 PM    * NSTEMI (non-ST elevated myocardial infarction) [facility])  Assessment & Plan  Pt with 2 days of diarrhea, nausea. Today was resting in bed when chest discomfort started, was feeling nauseous, vomited x1, continued to feel chest discomfort, left hand numbness, worsened after talking to family, began to be diaphoretic. EMS called. Received 324 ASA, and 1 dose of nitro. Elevated trop x3  S/P Cath x4 in LAD  Echo showed EF of 87% and no diastolic abnormalities     Plan  Continue statin, ASA, brillinta      Coronary artery disease involving native coronary artery  Assessment & Plan  S/p Cath. LAD stent x4    Diarrhea  Assessment & Plan  Prior to arrival patient complained of 2 days of watery diarrhea with nausea and vomiting x1. Patient continues to have watery diarrhea overnight 10-15 episodes. Also noticing some blood. Patient has a history of hemorrhoids.     Stool studies negative     Leukocytosis  Assessment & Plan  Most likely secondary to hemoconcentration versus reactive to lower GI virus   Results from last 7 days   Lab Units 09/26/23  0906 09/26/23  0441 09/25/23  1158 09/24/23  1221   WBC Thousand/uL 11.98* 12.27* 11.48* 11.39*   HEMOGLOBIN g/dL 15.9 15.8 16.6 18.3*   HEMATOCRIT % 45.5 45.9 48.1 53.6*   PLATELETS Thousands/uL 177 189 191 217   NEUTROS PCT %  --  72  --  53   LYMPHS PCT %  --  17  --  34   MONOS PCT %  --  9  --  9   EOS PCT %  --  1  --  3         Dyslipidemia  Assessment & Plan  Lab Results   Component Value Date    CHOLESTEROL 248 (H) 09/24/2023    TRIG 216 (H) 09/24/2023    HDL 42 09/24/2023    LDLCALC 163 (H) 09/24/2023     Started on Lipitor 40     Elevated hemoglobin (HCC)-resolved as of 9/26/2023  Assessment & Plan   Most likely secondary to hemoconcentration secondary to recent diarrhea    Recent Labs     09/25/23  1158 09/26/23  0441 09/26/23  0906   HGB 16.6 15.8 15.9           Medical Problems     Resolved Problems  Date Reviewed: 9/26/2023          Resolved    Elevated hemoglobin ([address]) 9/26/2023     Resolved by  Kerrie Jefferson MD        Discharging Resident: Kerrie Jefferson MD  Discharging Attending: No att. providers found  PCP: No primary care provider on file. Admission Date:   Admission Orders (From admission, onward)     Ordered        09/24/23 [facility]  Once                      Discharge Date: 09/26/23    Consultations During Hospital Stay:  · Cardiology and GI     Procedures Performed:   · Cardiac Cath    Significant Findings / Test Results:   XR chest 1 view portable    Result Date: 9/24/2023  Impression: No acute cardiopulmonary disease. Workstation performed: [id number]       Incidental Findings:   · none     Test Results Pending at Discharge (will require follow up):  · none     Outpatient Tests Requested:  · none    Complications:  none    Reason for Admission: NSTEMI    Hospital Course:   Sarah Herbert is a 47 y.o. male patient who originally presented to the hospital on 9/24/2023 due to acute chest discomfort for NSTEMI rule out. On route to the hospital patient received 324 mg of aspirin and nitro. Patient received brain gtt., Brilinta, aspirin, statin. Next day patient received cardiac catheterization and received 4 stents in the LAD. During his hospital stay patient continued to have diarrhea which had blood in the toilet. GI was consulted for evaluation of the bleeding but without significant bleed, pt was deemed stable and safe to continue heparin. On day of discharge, patient was stable on day of discharge and eager to go home. Please see above list of diagnoses and related plan for additional information.      Condition at Discharge: stable    Discharge Day Visit / Exam: Subjective: No acute events overnight. Patient denies chest pain, shortness of breath, abdominal pain. Patient does report 4 episodes of recent diarrhea without much bleeding. Vitals: Blood Pressure: 109/74 (09/26/23 0724)  Pulse: 78 (09/26/23 0724)  Temperature: 98.7 °F (37.1 °C) (09/26/23 0724)  Temp Source: Oral (09/25/23 1615)  Respirations: 18 (09/26/23 0724)  Height: 6' 4" (193 cm) (09/25/23 1121)  Weight - Scale: 122 kg (268 lb) (09/25/23 1121)  SpO2: 93 % (09/26/23 0724)  Exam:   Physical Exam  Vitals and nursing note reviewed. Constitutional:       Appearance: Normal appearance. HENT:      Head: Normocephalic and atraumatic. Cardiovascular:      Rate and Rhythm: Normal rate and regular rhythm. Pulses: Normal pulses. Heart sounds: Normal heart sounds. No murmur heard. No friction rub. No gallop. Pulmonary:      Effort: Pulmonary effort is normal. No respiratory distress. Breath sounds: Normal breath sounds. No wheezing or rales. Abdominal:      General: Bowel sounds are normal. There is no distension. Palpations: Abdomen is soft. Tenderness: There is no abdominal tenderness. There is no guarding. Skin:     General: Skin is warm and dry. Capillary Refill: Capillary refill takes less than 2 seconds. Neurological:      General: No focal deficit present. Mental Status: He is alert and oriented to person, place, and time. Discussion with Family: Patient declined call to . Discharge instructions/Information to patient and family:   See after visit summary for information provided to patient and family. Provisions for Follow-Up Care:  See after visit summary for information related to follow-up care and any pertinent home health orders. Disposition:   Home    Planned Readmission: none    Discharge Medications:  See after visit summary for reconciled discharge medications provided to patient and/or family.       **Please Note: This note may have been constructed using a voice recognition system**

## 2023-09-26 NOTE — PLAN OF CARE
Problem: PAIN - ADULT  Goal: Verbalizes/displays adequate comfort level or baseline comfort level  Description: Interventions:  - Encourage patient to monitor pain and request assistance  - Assess pain using appropriate pain scale  - Administer analgesics based on type and severity of pain and evaluate response  - Implement non-pharmacological measures as appropriate and evaluate response  - Consider cultural and social influences on pain and pain management  - Notify physician/advanced practitioner if interventions unsuccessful or patient reports new pain  Outcome: Progressing     Problem: INFECTION - ADULT  Goal: Absence or prevention of progression during hospitalization  Description: INTERVENTIONS:  - Assess and monitor for signs and symptoms of infection  - Monitor lab/diagnostic results  - Monitor all insertion sites, i.e. indwelling lines, tubes, and drains  - Monitor endotracheal if appropriate and nasal secretions for changes in amount and color  - [place] appropriate cooling/warming therapies per order  - Administer medications as ordered  - Instruct and encourage patient and family to use good hand hygiene technique  - Identify and instruct in appropriate isolation precautions for identified infection/condition  Outcome: Progressing  Goal: Absence of fever/infection during neutropenic period  Description: INTERVENTIONS:  - Monitor WBC    Outcome: Progressing     Problem: Knowledge Deficit  Goal: Patient/family/caregiver demonstrates understanding of disease process, treatment plan, medications, and discharge instructions  Description: Complete learning assessment and assess knowledge base.   Interventions:  - Provide teaching at level of understanding  - Provide teaching via preferred learning methods  Outcome: Progressing     Problem: SAFETY ADULT  Goal: Patient will remain free of falls  Description: INTERVENTIONS:  - Educate patient/family on patient safety including physical limitations  - Instruct patient to call for assistance with activity   - Consult OT/PT to assist with strengthening/mobility   - Keep Call bell within reach  - Keep bed low and locked with side rails adjusted as appropriate  - Keep care items and personal belongings within reach  - Initiate and maintain comfort rounds  - Make Fall Risk Sign visible to staff  - Offer Toileting every 2 Hours, in advance of need  - Initiate/Maintain bed/chair alarm  - Obtain necessary fall risk management equipment  - Apply yellow socks and bracelet for high fall risk patients  - Consider moving patient to room near nurses station  Outcome: Progressing  Goal: Maintain or return to baseline ADL function  Description: INTERVENTIONS:  -  Assess patient's ability to carry out ADLs; assess patient's baseline for ADL function and identify physical deficits which impact ability to perform ADLs (bathing, care of mouth/teeth, toileting, grooming, dressing, etc.)  - Assess/evaluate cause of self-care deficits   - Assess range of motion  - Assess patient's mobility; develop plan if impaired  - Assess patient's need for assistive devices and provide as appropriate  - Encourage maximum independence but intervene and supervise when necessary  - Involve family in performance of ADLs  - Assess for home care needs following discharge   - Consider OT consult to assist with ADL evaluation and planning for discharge  - Provide patient education as appropriate  Outcome: Progressing  Goal: Maintains/Returns to pre admission functional level  Description: INTERVENTIONS:  - Perform BMAT or MOVE assessment daily.   - Set and communicate daily mobility goal to care team and patient/family/caregiver.    - Collaborate with rehabilitation services on mobility goals if consulted  - Perform Range of Motion   - Reposition patient   - Dangle patient   - Stand patient  - Ambulate patient  - Out of bed to chair  - Out of bed for meals  - Out of bed for toileting  - Record patient progress and toleration of activity level   Outcome: Progressing     Problem: DISCHARGE PLANNING  Goal: Discharge to home or other facility with appropriate resources  Description: INTERVENTIONS:  - Identify barriers to discharge w/patient and caregiver  - Arrange for needed discharge resources and transportation as appropriate  - Identify discharge learning needs (meds, wound care, etc.)  - Arrange for interpretive services to assist at discharge as needed  - Refer to Case Management Department for coordinating discharge planning if the patient needs post-hospital services based on physician/advanced practitioner order or complex needs related to functional status, cognitive ability, or social support system  Outcome: Progressing     Problem: Nutrition/Hydration-ADULT  Goal: Nutrient/Hydration intake appropriate for improving, restoring or maintaining nutritional needs  Description: Monitor and assess patient's nutrition/hydration status for malnutrition. Collaborate with interdisciplinary team and initiate plan and interventions as ordered. Monitor patient's weight and dietary intake as ordered or per policy. Utilize nutrition screening tool and intervene as necessary. Determine patient's food preferences and provide high-protein, high-caloric foods as appropriate.      INTERVENTIONS:  - Monitor oral intake, urinary output, labs, and treatment plans  - Assess nutrition and hydration status and recommend course of action  - Evaluate amount of meals eaten  - Assist patient with eating if necessary   - Allow adequate time for meals  - Recommend/ encourage appropriate diets, oral nutritional supplements, and vitamin/mineral supplements  - Order, calculate, and assess calorie counts as needed  - Recommend, monitor, and adjust tube feedings and TPN/PPN based on assessed needs  - Assess need for intravenous fluids  - Provide specific nutrition/hydration education as appropriate  - Include patient/family/caregiver in decisions related to nutrition  Outcome: Progressing

## 2023-09-26 NOTE — PROGRESS NOTES
Progress Note - Yaakov Hayden 47 y.o. male MRN: [id number]    Unit/Bed#: S -01 Encounter: [id number]    Assessment and Plan:     78-year-old male who presented due to chest pain with concern for MI with plans for cardiac catheterization today, GI consulted for diarrhea with blood in stool.     1. Diarrhea with blood in stool without abdominal pain  C. difficile testing normal.  Stool enteric is in process. Diarrhea is beginning to improve. Hemoglobin remained stable at 16. No abdominal pain. Tolerating diet. Possibly infectious, question underlying hemorrhoids.    -From a GI standpoint, recommend continuing conservative management in the setting of recent NSTEMI this admission and catheterization yesterday. - Follow-up remaining stool studies.  - Consider once daily Questran if persistent diarrhea. - Continue monitor hemoglobin and stool output.  - From a GI standpoint, no indication to stop anticoagulation.    - Patient is overdue for colonoscopy. He will need this in the outpatient setting. We will need to discuss with cardiology as this likely would not be able to be for 6 to 12 months in order to hold Brilinta.  -Would recommend against any inpatient procedures unless patient is having persistent, severe diarrhea or persistent blood in the stool leading to drops in hemoglobin, etc.      Informed primary team of recommendations above.  ----------------------------------------------------------------------------------------------------------------    Subjective:     Patient reports diarrhea is improving. He had 2 episodes so far this morning which is still watery. He reports small pink-tinged water and some blood when wiping. No abdominal pain. Tolerated diet this morning. Objective:     Vitals: Blood pressure 109/74, pulse 78, temperature 98.7 °F (37.1 °C), resp. rate 18, height 6' 4" (1.93 m), weight 122 kg (268 lb), SpO2 93 %. ,Body mass index is 32.62 kg/m².       Intake/Output Summary (Last 24 hours) at 9/26/2023 1214  Last data filed at 9/26/2023 2053  Gross per 24 hour   Intake 240 ml   Output --   Net 240 ml       Physical Exam:     General Appearance: Sitting comfortably in bed. No distress. Lungs: Clear to auscultation bilaterally, no rales or rhonchi, no labored breathing/accessory muscle use  Heart: Regular rate and rhythm, S1, S2 normal, no murmur, click, rub or gallop  Abdomen: Soft, non-tender, non-distended; bowel sounds normal; no masses or no organomegaly. Benign abdomen  Extremities: No cyanosis, clubbing, or edema    Invasive Devices     Peripheral Intravenous Line  Duration           Peripheral IV 09/24/23 Left Antecubital 2 days    Peripheral IV 09/24/23 Right Antecubital 1 day                Lab Results:  Results from last 7 days   Lab Units 09/26/23  0906 09/26/23  0441   WBC Thousand/uL 11.98* 12.27*   HEMOGLOBIN g/dL 15.9 15.8   HEMATOCRIT % 45.5 45.9   PLATELETS Thousands/uL 177 189   NEUTROS PCT %  --  72   LYMPHS PCT %  --  17   MONOS PCT %  --  9   EOS PCT %  --  1     Results from last 7 days   Lab Units 09/26/23  0906 09/25/23  1158 09/24/23  1221   POTASSIUM mmol/L 3.8   < > 4.2   CHLORIDE mmol/L 104   < > 101   CO2 mmol/L 21   < > 21   BUN mg/dL 13   < > 16   CREATININE mg/dL 0.99   < > 1.28   CALCIUM mg/dL 8.8   < > 10.0   ALK PHOS U/L  --   --  104   ALT U/L  --   --  10   AST U/L  --   --  16    < > = values in this interval not displayed. Invalid input(s): "BILI"  Results from last 7 days   Lab Units 09/24/23  1221   INR  0.89           Imaging Studies: I have personally reviewed pertinent imaging studies. XR chest 1 view portable    Result Date: 9/24/2023  Impression: No acute cardiopulmonary disease.  Workstation performed: [id number]

## 2023-09-27 LAB
KCT BLD-ACNC: 327 SEC (ref 89–137)
SPECIMEN SOURCE: ABNORMAL

## 2023-10-02 NOTE — UTILIZATION REVIEW
NOTIFICATION OF ADMISSION DISCHARGE   This is a Notification of Discharge from [address] [place] [address]. Please be advised that this patient has been discharge from our facility. Below you will find the admission and discharge date and time including the patient’s disposition. UTILIZATION REVIEW CONTACT:  Mary Capone  Utilization   Network Utilization Review Department  Phone: [phone] x carefully listen to the prompts. All voicemails are confidential.  Email: [email or web address]     ADMISSION INFORMATION  PRESENTATION DATE: 9/24/2023 12:11 PM  OBERVATION ADMISSION DATE:   INPATIENT ADMISSION DATE: 9/24/23  1:32 PM   DISCHARGE DATE: 9/26/2023  4:07 PM   DISPOSITION:Home/Self Care    IMPORTANT INFORMATION:  Send all requests for admission clinical reviews, approved or denied determinations and any other requests to dedicated fax number below belonging to the campus where the patient is receiving treatment.  List of dedicated fax numbers:  [place] DENIALS (Administrative/Medical Necessity) [phone] [address] [facility] (Maternity/NICU/Pediatrics) [phone]   [facility] [phone]   [facility] [phone] [address] [facility] [phone]   [address] [place] [address] [phone]   [facility] [phone]   [address] [place] [address] [address] [place] [address] [phone]   [address] [place] [address] [phone]   [address] [facility] [phone]   [address] [facility] [address] [facility] [phone]

## 2023-10-04 ENCOUNTER — OFFICE VISIT (OUTPATIENT)
Dept: CARDIOLOGY CLINIC | Facility: CLINIC | Age: 53
End: 2023-10-04
Payer: COMMERCIAL

## 2023-10-04 ENCOUNTER — APPOINTMENT (OUTPATIENT)
Dept: LAB | Facility: CLINIC | Age: 53
End: 2023-10-04
Payer: COMMERCIAL

## 2023-10-04 VITALS
WEIGHT: 268 LBS | BODY MASS INDEX: 32.63 KG/M2 | DIASTOLIC BLOOD PRESSURE: 98 MMHG | HEART RATE: 60 BPM | HEIGHT: 76 IN | OXYGEN SATURATION: 98 % | SYSTOLIC BLOOD PRESSURE: 137 MMHG

## 2023-10-04 DIAGNOSIS — I21.4 NSTEMI (NON-ST ELEVATED MYOCARDIAL INFARCTION) (HCC): Primary | ICD-10-CM

## 2023-10-04 DIAGNOSIS — Z72.0 TOBACCO ABUSE: ICD-10-CM

## 2023-10-04 DIAGNOSIS — I25.110 CORONARY ARTERY DISEASE INVOLVING NATIVE CORONARY ARTERY OF NATIVE HEART WITH UNSTABLE ANGINA PECTORIS (HCC): ICD-10-CM

## 2023-10-04 DIAGNOSIS — Z95.5 STATUS POST INSERTION OF DRUG ELUTING CORONARY ARTERY STENT: ICD-10-CM

## 2023-10-04 DIAGNOSIS — E78.5 DYSLIPIDEMIA: ICD-10-CM

## 2023-10-04 DIAGNOSIS — Z09 HOSPITAL DISCHARGE FOLLOW-UP: ICD-10-CM

## 2023-10-04 DIAGNOSIS — I24.9 ACUTE CORONARY SYNDROME (HCC): ICD-10-CM

## 2023-10-04 LAB
ANION GAP SERPL CALCULATED.3IONS-SCNC: 4 MMOL/L
BUN SERPL-MCNC: 17 MG/DL (ref 5–25)
CALCIUM SERPL-MCNC: 9.7 MG/DL (ref 8.4–10.2)
CHLORIDE SERPL-SCNC: 103 MMOL/L (ref 96–108)
CHOLEST SERPL-MCNC: 115 MG/DL
CO2 SERPL-SCNC: 30 MMOL/L (ref 21–32)
CREAT SERPL-MCNC: 1.17 MG/DL (ref 0.6–1.3)
ERYTHROCYTE [DISTWIDTH] IN BLOOD BY AUTOMATED COUNT: 12.7 % (ref 11.6–15.1)
GFR SERPL CREATININE-BSD FRML MDRD: 71 ML/MIN/1.73SQ M
GLUCOSE P FAST SERPL-MCNC: 93 MG/DL (ref 65–99)
HCT VFR BLD AUTO: 47.6 % (ref 36.5–49.3)
HDLC SERPL-MCNC: 28 MG/DL
HGB BLD-MCNC: 16.8 G/DL (ref 12–17)
LDLC SERPL CALC-MCNC: 70 MG/DL (ref 0–100)
MCH RBC QN AUTO: 33.1 PG (ref 26.8–34.3)
MCHC RBC AUTO-ENTMCNC: 35.3 G/DL (ref 31.4–37.4)
MCV RBC AUTO: 94 FL (ref 82–98)
NONHDLC SERPL-MCNC: 87 MG/DL
PLATELET # BLD AUTO: 274 THOUSANDS/UL (ref 149–390)
PMV BLD AUTO: 9.2 FL (ref 8.9–12.7)
POTASSIUM SERPL-SCNC: 5 MMOL/L (ref 3.5–5.3)
RBC # BLD AUTO: 5.08 MILLION/UL (ref 3.88–5.62)
SODIUM SERPL-SCNC: 137 MMOL/L (ref 135–147)
TRIGL SERPL-MCNC: 85 MG/DL
WBC # BLD AUTO: 11.07 THOUSAND/UL (ref 4.31–10.16)

## 2023-10-04 PROCEDURE — 99215 OFFICE O/P EST HI 40 MIN: CPT | Performed by: NURSE PRACTITIONER

## 2023-10-04 PROCEDURE — 36415 COLL VENOUS BLD VENIPUNCTURE: CPT

## 2023-10-04 PROCEDURE — 99406 BEHAV CHNG SMOKING 3-10 MIN: CPT | Performed by: NURSE PRACTITIONER

## 2023-10-04 PROCEDURE — 80048 BASIC METABOLIC PNL TOTAL CA: CPT

## 2023-10-04 PROCEDURE — 85027 COMPLETE CBC AUTOMATED: CPT

## 2023-10-04 PROCEDURE — 80061 LIPID PANEL: CPT

## 2023-10-04 RX ORDER — ATORVASTATIN CALCIUM 40 MG/1
40 TABLET, FILM COATED ORAL
Qty: 90 TABLET | Refills: 3 | Status: SHIPPED | OUTPATIENT
Start: 2023-10-04 | End: 2024-09-28

## 2023-10-04 RX ORDER — VARENICLINE TARTRATE 0.5 MG/1
0.5 TABLET, FILM COATED ORAL 2 TIMES DAILY
Qty: 60 TABLET | Refills: 0 | Status: SHIPPED | OUTPATIENT
Start: 2023-10-04

## 2023-10-04 RX ORDER — ASPIRIN 81 MG/1
81 TABLET, CHEWABLE ORAL DAILY
Start: 2023-10-04

## 2023-10-04 NOTE — PROGRESS NOTES
Cardiology  MI Follow Up   Office Visit Note  Ana Read   47 y.o.   male   MRN: [id number]  [facility]  [address] [facility] [address]  [phone]    PCP: No primary care provider on file. Cardiologist: will be  or [place]            Summary of recommendations  Smoking cessation imperative. He is motivated. He would like a prescription for Chantix which was supplied. On days 1-3: 0.5 mg once daily. Days 4-7: 0.5 mg twice daily. Days 8 to end of treatment: 1 mg twice daily  Heart healthy diet. Educational information provided  Fasting lipid profile 4 weeks  nonfasting CBC, BMP  Cardiac rehab has been prescribed and recommended  Periodic home blood pressure monitoring recommended. I recommended Omron blood pressure machine  Follow up will be scheduled with Dr Kang Romero or Amelia Peterson 6 weeks  Smoking cessation counseling provided,x 5 minutes  Colon Ca screening: Not on file   Last Cologuard: Not on file         Assessment/plan  CAD,NSTEMI adm 9/24-9/26/23  · LHC: 1st Mrg lesion is 50% stenosed. ,Prox LAD lesion is 95% stenosed. .Mid LAD lesion is 80% stenosed. L Cx. Moderate in size. Nondominant. Mild diffuse disease throughout the vessel. OM1: 50% stenosedRCA: Disease throughout the vessel. · Intervention: PCI/KIMBERLY x 2 prox and mid LAD  • On aspirin 81 mg/d, Brilinta 90 mg q12h, a high intensity statin, beta-blocker  • Cardiac rehab has been prescribed and recommended  • Adherence to dual antiplatelet therapy reinforced  /98. Historically does not have hypertension. Currently on metoprolol tartrate 25 mg every 12. Continue to monitor. Periodic home blood pressure monitoring  Hyperlipidemia, on atorvastatin 40 mg daily. , non-. He reports he is now on a plant-based diet most days of the week. His diet previously was quite poor. Encouraged adherence to heart healthy diet.   Reassess 4 weeks goal LDL less than 70 Latest Reference Range & Units 09/24/23 12:21   Cholesterol See Comment mg/dL 248 (H)   Triglycerides See Comment mg/dL 216 (H)   HDL >=40 mg/dL 42   Non-HDL Cholesterol mg/dl 206   LDL Calculated 0 - 100 mg/dL 163 (H)   Tobacco abuse, 10 pack years cessation imperative. Currently unable to quit but desires to quit. Requesting Chantix and 1 was also prescribed. He reports he may have a history of depression but is not currently treated. He is now living with family. He requested Chantix. I advised if he has depressive symptoms after beginning it, that he should contact us promptly; this was reviewed specifically. Pre diabetes. HgA1c 5.7  Obesity, BMI 32  Cardiac testing  • TTE 9/25/23  EF 65%. No RWMA. Diastolic function normal. The following segments are hypokinetic: apical septal and apex. All other segments are normal.  Mild MR. Mild TR. The aortic root is normal in size. The ascending aorta is mildly dilated. The ascending aorta is 4 cm. • Cardiac catheterization 9/25/23   1st Mrg lesion is 50% stenosed. ,Prox LAD lesion is 95% stenosed. .Mid LAD lesion is 80% stenosed. L Cx---Moderate in size. Nondominant. Mild diffuse disease throughout the vessel. OM1: 50% stenosed. RCA-- Disease throughout the vessel. Intervention: PCI/DAMARIS x 2 prox and mid LAD              HPI  Kellie Grayson is a 46 yo male with no significant medical history. He does not follow with cardiologist.  He is obese; BMI 32. 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 He smokes 1/2 to 3/4 pack/day. No family history of heart disease      Adm 9/24-9/26/23  CC: chest tightness, brought by EMS. He reports diaphoresis, increased work of breathing and numbness down his arms with chest tightness. When EMS arrived, he was able to walk to the ambulance and reports he felt better once he was in the cool air. But then chest tightness started again when he got in the ambulance.    He was given SL nitro and aspirin 324 mg PO and reports chest pain resolved within a couple minutes. HS troponin  87--> 2,803-->7,472   cardiology was consulted. EKG NSR 62 bpm.ST abnormalities in anterior leads  Catheterization was pursued. This demonstrated a 95% proximal LAD, 80% mid LAD lesion. Otherwise, percent OM1. Underwent PCI/DAMARIS x2 to mid LAD  His echo showed preserved Ef 65% with hypokinesis of the apical septal and apex . Placed on DAPT with aspirin, and Brilinta, also on high intensity statin and beta-blocker  Smoking cessation imperative      10/4/23  ([facility] recent NSTEMI. Dyslipidemia. Obesity. Prediabetes. Obesity. DAMARIS x 2 LAD)  Hospital follow-up he is accompanied by his daughter. Review of systems: He denies chest pain. He is feeling pretty good. He does have a persistent cough. Occasional shortness of breath. Unfortunately continues to smoke. He is on desires to quit. He is requesting Chantix. He has never had this in the past.  He does have some history of depression in the past.  Not currently treated. He is also requesting a blood pressure monitor so he can take his blood pressures at home. Blood pressure today 137/98  Reports his diet was quite poor. He was living in [place], and frequently used [employer] for his meals. His diet has now changed. On several days he adheres to a plant-based diet  Together we reviewed his coronary angiogram and his echocardiogram.  He is aware of the findings  He is adherent to his medical therapy  Advised a follow-up fasting lipid profile in 1 month. Agreeable to nonfasting CBC, BMP today  I encouraged medical adherence particular to DAPT  Discussed the importance of cardiac rehab.  A prescription was placed    I have spent 40 minutes with Patient and family today in which greater than 50% of this time was spent in counseling/coordination of care regarding Diagnostic results, Instructions for management, Patient and family education, Importance of tx compliance, Documenting in the medical record, Reviewing / ordering tests, medicine, procedures   and Obtaining or reviewing history  . Assessment  Diagnoses and all orders for this visit:    NSTEMI (non-ST elevated myocardial infarction) [facility])    Coronary artery disease involving native coronary artery of native heart with unstable angina pectoris [facility])    Hospital discharge follow-up    Dyslipidemia          No past medical history on file. Review of Systems   Constitutional: Negative for chills. Cardiovascular: Positive for dyspnea on exertion. Negative for chest pain, claudication, cyanosis, irregular heartbeat, leg swelling, near-syncope, orthopnea, palpitations, paroxysmal nocturnal dyspnea and syncope. Respiratory: Positive for cough and shortness of breath. Gastrointestinal: Negative for heartburn and nausea. Neurological: Negative for dizziness, focal weakness, headaches, light-headedness and weakness. All other systems reviewed and are negative. Allergies   Allergen Reactions   • Zoloft [Sertraline] Hives     .     Current Outpatient Medications:   •  acetaminophen (TYLENOL) 325 mg tablet, Take 3 tablets (975 mg total) by mouth every 8 (eight) hours as needed for mild pain, headaches or fever, Disp: , Rfl: 0  •  aspirin (ECOTRIN LOW STRENGTH) 81 mg EC tablet, Take 1 tablet (81 mg total) by mouth daily Do not start before September 27, 2023., Disp: 30 tablet, Rfl: 0  •  atorvastatin (LIPITOR) 40 mg tablet, Take 1 tablet (40 mg total) by mouth daily with dinner, Disp: 30 tablet, Rfl: 0  •  metoprolol tartrate (LOPRESSOR) 25 mg tablet, Take 1 tablet (25 mg total) by mouth every 12 (twelve) hours, Disp: 60 tablet, Rfl: 0  •  nitroglycerin (NITROSTAT) 0.4 mg SL tablet, Place 1 tablet (0.4 mg total) under the tongue every 5 (five) minutes as needed for chest pain for up to 30 doses, Disp: 90 tablet, Rfl: 0  •  ticagrelor (Brilinta) 90 MG, Take 1 tablet (90 mg total) by mouth every 12 (twelve) hours, Disp: 60 tablet, Rfl: 0        Social History     Socioeconomic History   • Marital status: Single     Spouse name: Not on file   • Number of children: Not on file   • Years of education: Not on file   • Highest education level: Not on file   Occupational History   • Not on file   Tobacco Use   • Smoking status: Every Day     Packs/day: 0.50     Years: 20.00     Total pack years: 10.00     Types: Cigarettes   • Smokeless tobacco: Not on file   Substance and Sexual Activity   • Alcohol use: Never   • Drug use: Never   • Sexual activity: Not on file   Other Topics Concern   • Not on file   Social History Narrative   • Not on file     Social Determinants of Health     Financial Resource Strain: Not on file   Food Insecurity: Not on file   Transportation Needs: Not on file   Physical Activity: Not on file   Stress: Not on file   Social Connections: Not on file   Intimate Partner Violence: Not on file   Housing Stability: Not on file       No family history on file. Physical Exam  Vitals and nursing note reviewed. Constitutional:       General: He is not in acute distress. Appearance: He is obese. HENT:      Head: Normocephalic and atraumatic. Eyes:      Conjunctiva/sclera: Conjunctivae normal.   Cardiovascular:      Rate and Rhythm: Normal rate and regular rhythm. Pulses: Intact distal pulses. Heart sounds: Normal heart sounds. Pulmonary:      Effort: Pulmonary effort is normal.      Breath sounds: Normal breath sounds. Abdominal:      General: Bowel sounds are normal.      Palpations: Abdomen is soft. Musculoskeletal:         General: Normal range of motion. Cervical back: Normal range of motion and neck supple. Skin:     General: Skin is warm and dry. Neurological:      Mental Status: He is alert and oriented to person, place, and time. Vitals: There were no vitals taken for this visit.    Wt Readings from Last 3 Encounters:   09/25/23 122 kg (268 lb)         Labs & Results:  Lab Results   Component Value Date WBC 11.98 (H) 09/26/2023    HGB 15.9 09/26/2023    HCT 45.5 09/26/2023    MCV 92 09/26/2023     09/26/2023     No results found for: "BNP"  No components found for: "CHEM"  No results found for: "CKTOTAL", "TROPONINI", "TROPONINT", "CKMBINDEX"  No results found for this or any previous visit. No results found for this or any previous visit. This note was completed in part utilizing [employer] direct voice recognition software. Grammatical errors, random word insertion, spelling mistakes, and incomplete sentences may be an occasional consequence of the system secondary to software limitations, ambient noise and hardware issues. At the time of dictation, efforts were made to edit, clarify and /or correct errors. Please read the chart carefully and recognize, using context, where substitutions have occurred.   If you have any questions or concerns about the context, text or information contained within the body of this dictation, please contact myself, the provider, for further clarification

## 2023-10-04 NOTE — LETTER
October 4, 2023     Kolton Fried MD  [address] [facility],[address]    Patient: Charleen Jimenez   YOB: 1970   Date of Visit: 10/4/2023       Dear Dr. Michael Fletcher: Thank you for referring Pastor Guevara to me for evaluation. Below are my notes for this consultation. If you have questions, please do not hesitate to call me. I look forward to following your patient along with you. Sincerely,        JOSEPH Caldera        CC: No Recipients    JOSEPH Caldera  10/4/2023 11:04 AM  Sign when Signing Visit  Cardiology  MI Follow Up   Office Visit Note  Charleen Jimenez   47 y.o.   male   MRN: [id number]  [address]  [phone]    PCP: No primary care provider on file. Cardiologist: will be  or Julito            Summary of recommendations  Smoking cessation imperative. He is motivated. He would like a prescription for Chantix which was supplied. On days 1-3: 0.5 mg once daily. Days 4-7: 0.5 mg twice daily. Days 8 to end of treatment: 1 mg twice daily  Heart healthy diet. Educational information provided  Fasting lipid profile 4 weeks  nonfasting CBC, BMP  Cardiac rehab has been prescribed and recommended  Periodic home blood pressure monitoring recommended. I recommended Omron blood pressure machine  Follow up will be scheduled with Dr Zion Wylie or Estrella Cerna 6 weeks  Smoking cessation counseling provided,x 5 minutes  Colon Ca screening: Not on file   Last Cologuard: Not on file         Assessment/plan  CAD,NSTEMI adm 9/24-9/26/23  LHC: 1st Mrg lesion is 50% stenosed. ,Prox LAD lesion is 95% stenosed. .Mid LAD lesion is 80% stenosed. L Cx. Moderate in size. Nondominant. Mild diffuse disease throughout the vessel. OM1: 50% stenosedRCA: Disease throughout the vessel.   Intervention: PCI/KIMBERLY x 2 prox and mid LAD  On aspirin 81 mg/d, Brilinta 90 mg q12h, a high intensity statin, beta-blocker  Cardiac rehab has been prescribed and recommended  Adherence to dual antiplatelet therapy reinforced  /98. Historically does not have hypertension. Currently on metoprolol tartrate 25 mg every 12. Continue to monitor. Periodic home blood pressure monitoring  Hyperlipidemia, on atorvastatin 40 mg daily. , non-. He reports he is now on a plant-based diet most days of the week. His diet previously was quite poor. Encouraged adherence to heart healthy diet. Reassess 4 weeks goal LDL less than 70   Latest Reference Range & Units 09/24/23 12:21   Cholesterol See Comment mg/dL 248 (H)   Triglycerides See Comment mg/dL 216 (H)   HDL >=40 mg/dL 42   Non-HDL Cholesterol mg/dl 206   LDL Calculated 0 - 100 mg/dL 163 (H)   Tobacco abuse, 10 pack years cessation imperative. Currently unable to quit but desires to quit. Requesting Chantix and 1 was also prescribed. He reports he may have a history of depression but is not currently treated. He is now living with family. He requested Chantix. I advised if he has depressive symptoms after beginning it, that he should contact us promptly; this was reviewed specifically. Pre diabetes. HgA1c 5.7  Obesity, BMI 32  Cardiac testing  TTE 9/25/23  EF 65%. No RWMA. Diastolic function normal. The following segments are hypokinetic: apical septal and apex. All other segments are normal.  Mild MR. Mild TR. The aortic root is normal in size. The ascending aorta is mildly dilated. The ascending aorta is 4 cm. Cardiac catheterization 9/25/23   1st Mrg lesion is 50% stenosed. ,Prox LAD lesion is 95% stenosed. .Mid LAD lesion is 80% stenosed. L Cx---Moderate in size. Nondominant. Mild diffuse disease throughout the vessel. OM1: 50% stenosed. RCA-- Disease throughout the vessel. Intervention: PCI/DAMARIS x 2 prox and mid LAD              HPI  Laura Alatorre is a 46 yo male with no significant medical history.   He does not follow with cardiologist. He is obese; BMI 32. 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 He smokes 1/2 to 3/4 pack/day. No family history of heart disease      Adm 9/24-9/26/23  CC: chest tightness, brought by EMS. He reports diaphoresis, increased work of breathing and numbness down his arms with chest tightness. When EMS arrived, he was able to walk to the ambulance and reports he felt better once he was in the cool air. But then chest tightness started again when he got in the ambulance. He was given SL nitro and aspirin 324 mg PO and reports chest pain resolved within a couple minutes. HS troponin  87--> 2,803-->7,472   cardiology was consulted. EKG NSR 62 bpm.ST abnormalities in anterior leads  Catheterization was pursued. This demonstrated a 95% proximal LAD, 80% mid LAD lesion. Otherwise, percent OM1. Underwent PCI/DAMARIS x2 to mid LAD  His echo showed preserved Ef 65% with hypokinesis of the apical septal and apex . Placed on DAPT with aspirin, and Brilinta, also on high intensity statin and beta-blocker  Smoking cessation imperative      10/4/23  ([facility] recent NSTEMI. Dyslipidemia. Obesity. Prediabetes. Obesity. DAMARIS x 2 LAD)  Hospital follow-up he is accompanied by his daughter. Review of systems: He denies chest pain. He is feeling pretty good. He does have a persistent cough. Occasional shortness of breath. Unfortunately continues to smoke. He is on desires to quit. He is requesting Chantix. He has never had this in the past.  He does have some history of depression in the past.  Not currently treated. He is also requesting a blood pressure monitor so he can take his blood pressures at home. Blood pressure today 137/98  Reports his diet was quite poor. He was living in [place], and frequently used [employer] for his meals. His diet has now changed.   On several days he adheres to a plant-based diet  Together we reviewed his coronary angiogram and his echocardiogram.  He is aware of the findings  He is adherent to his medical therapy  Advised a follow-up fasting lipid profile in 1 month. Agreeable to nonfasting CBC, BMP today  I encouraged medical adherence particular to DAPT  Discussed the importance of cardiac rehab. A prescription was placed    I have spent 40 minutes with Patient and family today in which greater than 50% of this time was spent in counseling/coordination of care regarding Diagnostic results, Instructions for management, Patient and family education, Importance of tx compliance, Documenting in the medical record, Reviewing / ordering tests, medicine, procedures   and Obtaining or reviewing history  . Assessment  Diagnoses and all orders for this visit:    NSTEMI (non-ST elevated myocardial infarction) [facility])    Coronary artery disease involving native coronary artery of native heart with unstable angina pectoris [facility])    Hospital discharge follow-up    Dyslipidemia          No past medical history on file. Review of Systems   Constitutional: Negative for chills. Cardiovascular: Positive for dyspnea on exertion. Negative for chest pain, claudication, cyanosis, irregular heartbeat, leg swelling, near-syncope, orthopnea, palpitations, paroxysmal nocturnal dyspnea and syncope. Respiratory: Positive for cough and shortness of breath. Gastrointestinal: Negative for heartburn and nausea. Neurological: Negative for dizziness, focal weakness, headaches, light-headedness and weakness. All other systems reviewed and are negative. Allergies   Allergen Reactions   • Zoloft [Sertraline] Hives     .     Current Outpatient Medications:   •  acetaminophen (TYLENOL) 325 mg tablet, Take 3 tablets (975 mg total) by mouth every 8 (eight) hours as needed for mild pain, headaches or fever, Disp: , Rfl: 0  •  aspirin (ECOTRIN LOW STRENGTH) 81 mg EC tablet, Take 1 tablet (81 mg total) by mouth daily Do not start before September 27, 2023., Disp: 30 tablet, Rfl: 0  •  atorvastatin (LIPITOR) 40 mg tablet, Take 1 tablet (40 mg total) by mouth daily with dinner, Disp: 30 tablet, Rfl: 0  •  metoprolol tartrate (LOPRESSOR) 25 mg tablet, Take 1 tablet (25 mg total) by mouth every 12 (twelve) hours, Disp: 60 tablet, Rfl: 0  •  nitroglycerin (NITROSTAT) 0.4 mg SL tablet, Place 1 tablet (0.4 mg total) under the tongue every 5 (five) minutes as needed for chest pain for up to 30 doses, Disp: 90 tablet, Rfl: 0  •  ticagrelor (Brilinta) 90 MG, Take 1 tablet (90 mg total) by mouth every 12 (twelve) hours, Disp: 60 tablet, Rfl: 0        Social History     Socioeconomic History   • Marital status: Single     Spouse name: Not on file   • Number of children: Not on file   • Years of education: Not on file   • Highest education level: Not on file   Occupational History   • Not on file   Tobacco Use   • Smoking status: Every Day     Packs/day: 0.50     Years: 20.00     Total pack years: 10.00     Types: Cigarettes   • Smokeless tobacco: Not on file   Substance and Sexual Activity   • Alcohol use: Never   • Drug use: Never   • Sexual activity: Not on file   Other Topics Concern   • Not on file   Social History Narrative   • Not on file     Social Determinants of Health     Financial Resource Strain: Not on file   Food Insecurity: Not on file   Transportation Needs: Not on file   Physical Activity: Not on file   Stress: Not on file   Social Connections: Not on file   Intimate Partner Violence: Not on file   Housing Stability: Not on file       No family history on file. Physical Exam  Vitals and nursing note reviewed. Constitutional:       General: He is not in acute distress. Appearance: He is obese. HENT:      Head: Normocephalic and atraumatic. Eyes:      Conjunctiva/sclera: Conjunctivae normal.   Cardiovascular:      Rate and Rhythm: Normal rate and regular rhythm. Pulses: Intact distal pulses. Heart sounds: Normal heart sounds.    Pulmonary:      Effort: Pulmonary effort is normal.      Breath sounds: Normal breath sounds. Abdominal:      General: Bowel sounds are normal.      Palpations: Abdomen is soft. Musculoskeletal:         General: Normal range of motion. Cervical back: Normal range of motion and neck supple. Skin:     General: Skin is warm and dry. Neurological:      Mental Status: He is alert and oriented to person, place, and time. Vitals: There were no vitals taken for this visit. Wt Readings from Last 3 Encounters:   09/25/23 122 kg (268 lb)         Labs & Results:  Lab Results   Component Value Date    WBC 11.98 (H) 09/26/2023    HGB 15.9 09/26/2023    HCT 45.5 09/26/2023    MCV 92 09/26/2023     09/26/2023     No results found for: "BNP"  No components found for: "CHEM"  No results found for: "CKTOTAL", "TROPONINI", "TROPONINT", "CKMBINDEX"  No results found for this or any previous visit. No results found for this or any previous visit. This note was completed in part utilizing Vis[employer]acks direct voice recognition software. Grammatical errors, random word insertion, spelling mistakes, and incomplete sentences may be an occasional consequence of the system secondary to software limitations, ambient noise and hardware issues. At the time of dictation, efforts were made to edit, clarify and /or correct errors. Please read the chart carefully and recognize, using context, where substitutions have occurred.   If you have any questions or concerns about the context, text or information contained within the body of this dictation, please contact myself, the provider, for further clarification

## 2023-10-04 NOTE — LETTER
[facility] [place]  [address] [place] [address]  [address]  [facility] [address]  Dept: [phone]    October 4, 2023    Patient: Ana Read  YOB: 1970    Ana Read was seen and evaluated at our [facility]. 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            JOSEPH Gibbons

## 2023-10-04 NOTE — PATIENT INSTRUCTIONS
Mediterranean Diet   AMBULATORY CARE:   A Mediterranean diet  is a meal plan that includes foods that are commonly eaten in countries that border the [facility]. This meal plan may provide several health benefits. These include losing or maintaining weight, and decreasing blood pressure, blood sugar, and cholesterol levels. It may also help protect against certain health conditions such as heart disease, cancer, type 2 diabetes, and Alzheimer disease. Work with a dietitian to develop a meal plan that is right for you. Foods to include in the [address] [place] [address] diet:   Include fruits and vegetables in each meal.  Eat a variety of fresh fruits and vegetables. Choose whole grains every day. These foods include whole-grain breads, pastas, and cereals. It also includes brown rice, quinoa, and millet. Use unsaturated fats instead of saturated fats. Cook with olive or canola oil. Limit saturated fats, such as butter, margarine, and shortening. Saturated fat is an unhealthy fat that can increase your cholesterol levels. Choose plant foods, poultry, and fish as your main sources of protein. Eat plant-based foods that provide protein,  such as lentils, beans, chickpeas, nuts, and seeds. Choose mostly plant-based foods in place of meat on most days of the week. Eat protein foods high in omega-3 fats. Fish high in omega-3 fats include salmon, trout, and tuna. Include these types of fish 1 or 2 times each week. Limit fish high in mercury, such as shark, swordfish, tilefish, and rudy mackerel. Omega-3 fats are also found in walnuts and flaxseed. Choose poultry (chicken or turkey)  without skin instead of red meat. Red meat is high in saturated fat. Limit eggs and high-fat meats, such as mayers, sausage, and hot dogs. Choose low-fat dairy foods  such as nonfat or 1% milk, or low-fat almond, cashew, or soy milk. Other examples include low-fat cheese, yogurt, and cottage cheese. Limit sweets. Limit your intake of high-sugar foods, such as soda, desserts, and candy. Talk to your healthcare provider about alcohol. Studies have shown that moderate intake of wine may reduce the risk of heart disease. A moderate amount of wine is 1 serving for women and men 65 years and older each day. Two servings is recommended for men 24to 59years of age each day. A serving of wine is 5 ounces. Other things you need to know if you follow the Mediterranean diet:   Include foods high in iron and vitamin C.  Plant-based foods that are high in iron include spinach, beans, tofu, and artichoke. Eat a serving of vitamin C with any iron-rich food to help your body absorb more iron. Examples include oranges, strawberries, cantaloupe, broccoli, and yellow peppers. Get regular physical activity. The Mediterranean diet will have the most benefit if you get regular physical activity. Get 30 minutes of physical activity at least 5 days a week. Choose physical activities that increase your heart rate. Examples include walking, hiking, swimming, and riding a bike. Ask your healthcare provider about the best exercise plan for you. © Copyright Loletta Gosselin 2023 Information is for End User's use only and may not be sold, redistributed or otherwise used for commercial purposes. The above information is an  only. It is not intended as medical advice for individual conditions or treatments. Talk to your doctor, nurse or pharmacist before following any medical regimen to see if it is safe and effective for you.       Chantix:   Days 1 to 3: 0.5 mg orally once a day  Days 4 to 7: 0.5 mg orally twice a day  Days 8 to end of treatment: 1 mg orally twice a day

## 2023-10-28 DIAGNOSIS — Z72.0 TOBACCO ABUSE: ICD-10-CM

## 2023-10-30 RX ORDER — VARENICLINE TARTRATE 0.5 MG/1
TABLET, FILM COATED ORAL
Qty: 180 TABLET | Refills: 1 | Status: SHIPPED | OUTPATIENT
Start: 2023-10-30

## 2023-11-17 DIAGNOSIS — I24.9 ACUTE CORONARY SYNDROME (HCC): ICD-10-CM

## 2024-01-24 DIAGNOSIS — I24.9 ACUTE CORONARY SYNDROME (HCC): ICD-10-CM

## 2024-01-26 ENCOUNTER — OFFICE VISIT (OUTPATIENT)
Dept: CARDIOLOGY CLINIC | Facility: CLINIC | Age: 54
End: 2024-01-26
Payer: COMMERCIAL

## 2024-01-26 VITALS
SYSTOLIC BLOOD PRESSURE: 148 MMHG | OXYGEN SATURATION: 99 % | DIASTOLIC BLOOD PRESSURE: 92 MMHG | HEIGHT: 76 IN | WEIGHT: 278.3 LBS | HEART RATE: 75 BPM | BODY MASS INDEX: 33.89 KG/M2

## 2024-01-26 DIAGNOSIS — I25.110 CORONARY ARTERY DISEASE INVOLVING NATIVE CORONARY ARTERY OF NATIVE HEART WITH UNSTABLE ANGINA PECTORIS (HCC): Primary | ICD-10-CM

## 2024-01-26 PROCEDURE — 99214 OFFICE O/P EST MOD 30 MIN: CPT | Performed by: INTERNAL MEDICINE

## 2024-01-26 NOTE — PROGRESS NOTES
Cardiology Follow Up    Ronald Aguirre  1970  [id number]  [facility] [place]  [address] [facility] [address]  [address]  [facility] [id number] [phone]    1. Coronary artery disease involving native coronary artery of native heart with unstable angina pectoris (HCC)            Interval History: Followup CAD.    No chest pain, dyspnea or palpitatins.     Medical Problems       Problem List       NSTEMI (non-ST elevated myocardial infarction) (HCC)    Dyslipidemia    Leukocytosis    Diarrhea    Coronary artery disease involving native coronary artery    Hospital discharge follow-up        History reviewed. No pertinent past medical history.  Social History     Socioeconomic History    Marital status: Single     Spouse name: Not on file    Number of children: Not on file    Years of education: Not on file    Highest education level: Not on file   Occupational History    Not on file   Tobacco Use    Smoking status: Every Day     Current packs/day: 0.50     Average packs/day: 0.5 packs/day for 20.0 years (10.0 ttl pk-yrs)     Types: Cigarettes    Smokeless tobacco: Current   Vaping Use    Vaping status: Never Used   Substance and Sexual Activity    Alcohol use: Never    Drug use: Never    Sexual activity: Not on file   Other Topics Concern    Not on file   Social History Narrative    Not on file     Social Determinants of Health     Financial Resource Strain: Not on file   Food Insecurity: Not on file   Transportation Needs: Not on file   Physical Activity: Not on file   Stress: Not on file   Social Connections: Not on file   Intimate Partner Violence: Not on file   Housing Stability: Not on file      Family History   Problem Relation Age of Onset    Heart disease Maternal Grandmother      Past Surgical History:   Procedure Laterality Date    CARDIAC CATHETERIZATION N/A 9/25/2023    Procedure: Cardiac catheterization;  Surgeon: Rohith Kovacs MD;   "[facility];  Service: Cardiology       Current Outpatient Medications:     acetaminophen (TYLENOL) 325 mg tablet, Take 3 tablets (975 mg total) by mouth every 8 (eight) hours as needed for mild pain, headaches or fever, Disp: , Rfl: 0    aspirin 81 mg chewable tablet, Chew 1 tablet (81 mg total) daily, Disp: , Rfl:     atorvastatin (LIPITOR) 40 mg tablet, Take 1 tablet (40 mg total) by mouth daily with dinner, Disp: 90 tablet, Rfl: 3    metoprolol tartrate (LOPRESSOR) 25 mg tablet, Take 1 tablet (25 mg total) by mouth every 12 (twelve) hours, Disp: 60 tablet, Rfl: 3    nitroglycerin (NITROSTAT) 0.4 mg SL tablet, Place 1 tablet (0.4 mg total) under the tongue every 5 (five) minutes as needed for chest pain for up to 30 doses, Disp: 90 tablet, Rfl: 0    ticagrelor (Brilinta) 90 MG, Take 1 tablet (90 mg total) by mouth every 12 (twelve) hours, Disp: 180 tablet, Rfl: 0    varenicline (CHANTIX) 0.5 mg tablet, PLEASE SEE ATTACHED FOR DETAILED DIRECTIONS (Patient not taking: Reported on 1/26/2024), Disp: 180 tablet, Rfl: 1  Allergies   Allergen Reactions    Zoloft [Sertraline] Hives       Labs:     Chemistry        Component Value Date/Time    K 5.0 10/04/2023 1105     10/04/2023 1105    CO2 30 10/04/2023 1105    BUN 17 10/04/2023 1105    CREATININE 1.17 10/04/2023 1105        Component Value Date/Time    CALCIUM 9.7 10/04/2023 1105    ALKPHOS 104 09/24/2023 1221    AST 16 09/24/2023 1221    ALT 10 09/24/2023 1221            No results found for: \"CHOL\"  Lab Results   Component Value Date    HDL 28 (L) 10/04/2023    HDL 42 09/24/2023     Lab Results   Component Value Date    LDLCALC 70 10/04/2023    LDLCALC 163 (H) 09/24/2023     Lab Results   Component Value Date    TRIG 85 10/04/2023    TRIG 216 (H) 09/24/2023     No results found for: \"CHOLHDL\"    Imaging: No results found.    EKG: .    Review of Systems   Constitutional: Negative.   HENT: Negative.     Eyes: Negative.    Cardiovascular: " Negative.    Respiratory: Negative.     Endocrine: Negative.    Hematologic/Lymphatic: Negative.    Skin: Negative.    Musculoskeletal: Negative.    Gastrointestinal: Negative.    Genitourinary: Negative.    Neurological: Negative.    Psychiatric/Behavioral: Negative.     Allergic/Immunologic: Negative.        Vitals:    01/26/24 0840   BP: 148/92   Pulse: 75   SpO2: 99%           Physical Exam  Vitals reviewed.   Constitutional:       Appearance: Normal appearance.   HENT:      Head: Normocephalic.      Nose: Nose normal.      Mouth/Throat:      Mouth: Mucous membranes are moist.      Pharynx: Oropharynx is clear.   Eyes:      General: No scleral icterus.     Conjunctiva/sclera: Conjunctivae normal.   Cardiovascular:      Rate and Rhythm: Normal rate and regular rhythm.      Heart sounds: No murmur heard.     No friction rub. No gallop.   Pulmonary:      Effort: Pulmonary effort is normal. No respiratory distress.      Breath sounds: Normal breath sounds. No wheezing or rales.   Abdominal:      General: Abdomen is flat. Bowel sounds are normal. There is no distension.      Palpations: Abdomen is soft.      Tenderness: There is no abdominal tenderness. There is no guarding.   Musculoskeletal:      Cervical back: Normal range of motion and neck supple.      Right lower leg: No edema.      Left lower leg: No edema.   Skin:     General: Skin is warm and dry.   Neurological:      General: No focal deficit present.      Mental Status: He is alert and oriented to person, place, and time.   Psychiatric:         Mood and Affect: Mood normal.         Behavior: Behavior normal.         Discussion/Summary:    CAD: S/P DAMARIS to LAD. Continue DAPT one year. Continue with remainder of medical therapy.     Recommend to check BP as outpatient given mildly elevated today.       The patient was counseled regarding diagnostic results, instructions for management, risk factor reductions, impressions. total time of encounter was 25  minutes and 15 minutes was spent counseling.

## 2024-02-22 DIAGNOSIS — I24.9 ACUTE CORONARY SYNDROME (HCC): ICD-10-CM

## 2024-04-26 DIAGNOSIS — I24.9 ACUTE CORONARY SYNDROME (HCC): ICD-10-CM

## 2024-05-31 ENCOUNTER — OFFICE VISIT (OUTPATIENT)
Dept: URGENT CARE | Facility: CLINIC | Age: 54
End: 2024-05-31
Payer: COMMERCIAL

## 2024-05-31 VITALS
BODY MASS INDEX: 36.41 KG/M2 | TEMPERATURE: 98.4 F | SYSTOLIC BLOOD PRESSURE: 140 MMHG | WEIGHT: 299 LBS | HEIGHT: 76 IN | DIASTOLIC BLOOD PRESSURE: 90 MMHG | RESPIRATION RATE: 16 BRPM | HEART RATE: 54 BPM | OXYGEN SATURATION: 99 %

## 2024-05-31 DIAGNOSIS — J01.00 ACUTE MAXILLARY SINUSITIS, RECURRENCE NOT SPECIFIED: Primary | ICD-10-CM

## 2024-05-31 PROCEDURE — G0382 LEV 3 HOSP TYPE B ED VISIT: HCPCS | Performed by: NURSE PRACTITIONER

## 2024-05-31 RX ORDER — AMOXICILLIN AND CLAVULANATE POTASSIUM 875; 125 MG/1; MG/1
1 TABLET, FILM COATED ORAL EVERY 12 HOURS SCHEDULED
Qty: 14 TABLET | Refills: 0 | Status: SHIPPED | OUTPATIENT
Start: 2024-05-31 | End: 2024-06-07

## 2024-05-31 NOTE — PATIENT INSTRUCTIONS
--Rest, drink plenty of fluids  --Take antibiotic as prescribed. Take with food.    --Consider vitamin C, zinc, quercetin, and vitamin D to help strengthen your immune system  --For cough, you can take an OTC expectorant such as plain Robitussion or Mucinex (active ingredient guaifenesin).  A spoonful of honey at bedtime may also be helpful, as may a prescription cough medicine.  Also recommended is the use of a cool mist humidifier (with or without Vicks) in the bedroom at night.   --For sore throat, you can take OTC lozenges, use warm gargles (salt water or apple cider vinegar and honey), herbal teas, or an OTC throat spray (Chloraseptic).  --For nasal/sinus congestion, helpful measures include steam, warm compresses, an OTC saline nasal spray or Neti pot, or an OTC decongestant (such as Sudafed).  The decongestant should be avoided, however, if you are under 6 years of age, or have a history of high blood pressure or heart disease.  In addition, an OTC nasal steroid (Flonase, Nasocort) or nasal decongestant (Afrin, [place]-synephrine) may be taken.  The nasal steroid should be used at bedtime, after the saline nasal spray. The nasal decongestant should not be taken more than 3 days consecutively in order to prevent rebound congestion.   --For nasal drainage, postnasal drip, sneezing and itching, an OTC antihistamine (Allegra, Benadryl, etc) can be taken.   --You can take Tylenol or Motrin/Advil as needed for fever, headache, body aches. Motrin/Advil should be avoided, however, if you have a history of heart disease, bleeding ulcers, or if you take blood thinners.   --You should contact your primary care provider and/or go to the ER if your symptoms are not improved or get worse over the next 7 days.  This includes new onset fever, localized ear pain, sinus pain, as well as worsening cough, chest pain, shortness of breath, or significant weakness/fatigue.

## 2024-05-31 NOTE — PROGRESS NOTES
[facility]        NAME: Ronald Aguirre is a 54 y.o. male  : 1970    MRN: [id number]  DATE: May 31, 2024  TIME: 11:38 AM    Assessment and Plan   Acute maxillary sinusitis, recurrence not specified [J01.00]  1. Acute maxillary sinusitis, recurrence not specified  amoxicillin-clavulanate (AUGMENTIN) 875-125 mg per tablet            Patient Instructions     Patient Instructions   --Rest, drink plenty of fluids  --Take antibiotic as prescribed. Take with food.    --Consider vitamin C, zinc, quercetin, and vitamin D to help strengthen your immune system  --For cough, you can take an OTC expectorant such as plain Robitussion or Mucinex (active ingredient guaifenesin).  A spoonful of honey at bedtime may also be helpful, as may a prescription cough medicine.  Also recommended is the use of a cool mist humidifier (with or without Vicks) in the bedroom at night.   --For sore throat, you can take OTC lozenges, use warm gargles (salt water or apple cider vinegar and honey), herbal teas, or an OTC throat spray (Chloraseptic).  --For nasal/sinus congestion, helpful measures include steam, warm compresses, an OTC saline nasal spray or Neti pot, or an OTC decongestant (such as Sudafed).  The decongestant should be avoided, however, if you are under 6 years of age, or have a history of high blood pressure or heart disease.  In addition, an OTC nasal steroid (Flonase, Nasocort) or nasal decongestant (Afrin, [place]-synephrine) may be taken.  The nasal steroid should be used at bedtime, after the saline nasal spray. The nasal decongestant should not be taken more than 3 days consecutively in order to prevent rebound congestion.   --For nasal drainage, postnasal drip, sneezing and itching, an OTC antihistamine (Allegra, Benadryl, etc) can be taken.   --You can take Tylenol or Motrin/Advil as needed for fever, headache, body aches. Motrin/Advil should be avoided, however, if you have a history of heart disease,  bleeding ulcers, or if you take blood thinners.   --You should contact your primary care provider and/or go to the ER if your symptoms are not improved or get worse over the next 7 days.  This includes new onset fever, localized ear pain, sinus pain, as well as worsening cough, chest pain, shortness of breath, or significant weakness/fatigue.          If tests have been performed at [facility], our office will contact you with results if changes need to be made to the care plan discussed with you at the visit.  You can [facility] on [facility].    Chief Complaint     Chief Complaint   Patient presents with    Sinusitis     Sinus pain and pressure x 5-6 days         History of Present Illness       Here with complaints of suspected sinus infection.   Notes bilateral sinus pain, nasal/ear congestion, green rhinorrhea x 5 days.    Some sore throat.  Mild cough.    No fever.   No GI complaints.   Taking Mucinex, Dayquil, Nyquil.    No known sick contacts.          Review of Systems   Review of Systems   Constitutional:  Negative for fever.   HENT:  Positive for rhinorrhea, sinus pressure, sinus pain and sore throat.    Respiratory:  Positive for cough.    Gastrointestinal:  Negative for abdominal pain, diarrhea, nausea and vomiting.   Neurological:  Positive for headaches.         Current Medications       Current Outpatient Medications:     amoxicillin-clavulanate (AUGMENTIN) 875-125 mg per tablet, Take 1 tablet by mouth every 12 (twelve) hours for 7 days, Disp: 14 tablet, Rfl: 0    acetaminophen (TYLENOL) 325 mg tablet, Take 3 tablets (975 mg total) by mouth every 8 (eight) hours as needed for mild pain, headaches or fever, Disp: , Rfl: 0    aspirin 81 mg chewable tablet, Chew 1 tablet (81 mg total) daily, Disp: , Rfl:     atorvastatin (LIPITOR) 40 mg tablet, Take 1 tablet (40 mg total) by mouth daily with dinner, Disp: 90 tablet, Rfl: 3    metoprolol tartrate (LOPRESSOR) 25 mg tablet, Take 1  "tablet (25 mg total) by mouth every 12 (twelve) hours, Disp: 180 tablet, Rfl: 3    nitroglycerin (NITROSTAT) 0.4 mg SL tablet, Place 1 tablet (0.4 mg total) under the tongue every 5 (five) minutes as needed for chest pain for up to 30 doses, Disp: 90 tablet, Rfl: 0    ticagrelor (Brilinta) 90 MG, Take 1 tablet (90 mg total) by mouth every 12 (twelve) hours, Disp: 180 tablet, Rfl: 0    varenicline (CHANTIX) 0.5 mg tablet, PLEASE SEE ATTACHED FOR DETAILED DIRECTIONS (Patient not taking: Reported on 1/26/2024), Disp: 180 tablet, Rfl: 1    Current Allergies     Allergies as of 05/31/2024 - Reviewed 05/31/2024   Allergen Reaction Noted    Zoloft [sertraline] Hives 09/24/2023            The following portions of the patient's history were reviewed and updated as appropriate: allergies, current medications, past family history, past medical history, past social history, past surgical history and problem list.     History reviewed. No pertinent past medical history.    Past Surgical History:   Procedure Laterality Date    CARDIAC CATHETERIZATION N/A 9/25/2023    Procedure: Cardiac catheterization;  Surgeon: Rohith Kovacs MD;  [facility];  Service: Cardiology       Family History   Problem Relation Age of Onset    Heart disease Maternal Grandmother          Medications have been verified.        Objective   /90   Pulse (!) 54   Temp 98.4 °F (36.9 °C)   Resp 16   Ht 6' 4\" (1.93 m)   Wt 136 kg (299 lb)   SpO2 99%   BMI 36.40 kg/m²   No LMP for male patient.       Physical Exam     Physical Exam  Constitutional:       General: He is not in acute distress.     Appearance: He is well-developed. He is not diaphoretic.   HENT:      Head: Normocephalic and atraumatic.      Right Ear: External ear normal.      Left Ear: External ear normal.      Nose: Rhinorrhea present. No congestion.      Comments: Bilateral maxillary sinus tenderness without overlying skin changes.       Mouth/Throat:      Mouth: " Oropharynx is clear and moist.      Pharynx: No oropharyngeal exudate or posterior oropharyngeal erythema.   Eyes:      Conjunctiva/sclera: Conjunctivae normal.      Pupils: Pupils are equal, round, and reactive to light.   Neck:      Thyroid: No thyromegaly.   Cardiovascular:      Rate and Rhythm: Normal rate and regular rhythm.      Heart sounds: Normal heart sounds.   Pulmonary:      Effort: Pulmonary effort is normal.      Breath sounds: Normal breath sounds.   Abdominal:      General: Bowel sounds are normal.      Palpations: Abdomen is soft.      Tenderness: There is no abdominal tenderness.   Musculoskeletal:      Cervical back: Neck supple.   Lymphadenopathy:      Cervical: No cervical adenopathy.   Skin:     General: Skin is warm and dry.   Neurological:      Mental Status: He is alert and oriented to person, place, and time.      Deep Tendon Reflexes: Reflexes are normal and symmetric.   Psychiatric:         Mood and Affect: Mood and affect normal.

## 2024-07-30 DIAGNOSIS — I24.9 ACUTE CORONARY SYNDROME (HCC): ICD-10-CM

## 2024-07-31 RX ORDER — TICAGRELOR 90 MG/1
90 TABLET ORAL EVERY 12 HOURS
Qty: 180 TABLET | Refills: 0 | Status: SHIPPED | OUTPATIENT
Start: 2024-07-31

## 2024-10-17 DIAGNOSIS — I24.9 ACUTE CORONARY SYNDROME (HCC): ICD-10-CM

## 2024-10-17 RX ORDER — ATORVASTATIN CALCIUM 40 MG/1
40 TABLET, FILM COATED ORAL
Qty: 30 TABLET | Refills: 0 | Status: SHIPPED | OUTPATIENT
Start: 2024-10-17 | End: 2025-10-12

## 2024-10-28 DIAGNOSIS — I24.9 ACUTE CORONARY SYNDROME (HCC): ICD-10-CM

## 2024-10-29 RX ORDER — TICAGRELOR 90 MG/1
90 TABLET ORAL EVERY 12 HOURS
Qty: 180 TABLET | Refills: 0 | Status: SHIPPED | OUTPATIENT
Start: 2024-10-29

## 2024-11-08 ENCOUNTER — TELEPHONE (OUTPATIENT)
Dept: CARDIOLOGY CLINIC | Facility: CLINIC | Age: 54
End: 2024-11-08

## 2024-11-16 DIAGNOSIS — I24.9 ACUTE CORONARY SYNDROME (HCC): ICD-10-CM

## 2024-11-19 RX ORDER — ATORVASTATIN CALCIUM 40 MG/1
40 TABLET, FILM COATED ORAL
Qty: 90 TABLET | Refills: 1 | Status: SHIPPED | OUTPATIENT
Start: 2024-11-19

## 2024-12-19 ENCOUNTER — OFFICE VISIT (OUTPATIENT)
Dept: CARDIOLOGY CLINIC | Facility: CLINIC | Age: 54
End: 2024-12-19

## 2024-12-19 VITALS
BODY MASS INDEX: 38.36 KG/M2 | SYSTOLIC BLOOD PRESSURE: 154 MMHG | HEIGHT: 76 IN | HEART RATE: 58 BPM | OXYGEN SATURATION: 98 % | WEIGHT: 315 LBS | DIASTOLIC BLOOD PRESSURE: 92 MMHG

## 2024-12-19 DIAGNOSIS — I24.9 ACUTE CORONARY SYNDROME (HCC): ICD-10-CM

## 2024-12-19 DIAGNOSIS — I10 PRIMARY HYPERTENSION: ICD-10-CM

## 2024-12-19 DIAGNOSIS — R73.03 PREDIABETES: ICD-10-CM

## 2024-12-19 DIAGNOSIS — I25.10 CORONARY ARTERY DISEASE INVOLVING NATIVE CORONARY ARTERY OF NATIVE HEART, UNSPECIFIED WHETHER ANGINA PRESENT: ICD-10-CM

## 2024-12-19 DIAGNOSIS — E78.5 DYSLIPIDEMIA: ICD-10-CM

## 2024-12-19 DIAGNOSIS — Z72.0 TOBACCO ABUSE: ICD-10-CM

## 2024-12-19 DIAGNOSIS — I21.4 NSTEMI (NON-ST ELEVATED MYOCARDIAL INFARCTION) (HCC): Primary | ICD-10-CM

## 2024-12-19 RX ORDER — ATORVASTATIN CALCIUM 80 MG/1
80 TABLET, FILM COATED ORAL
Qty: 90 TABLET | Refills: 3 | Status: SHIPPED | OUTPATIENT
Start: 2024-12-19

## 2024-12-19 NOTE — ASSESSMENT & PLAN NOTE
Lab Results   Component Value Date/Time    CHOLESTEROL 115 10/04/2023 11:05 AM    TRIG 85 10/04/2023 11:05 AM    HDL 28 (L) 10/04/2023 11:05 AM    LDLCALC 70 10/04/2023 11:05 AM   LDL goal <55 . No recent labs  Repeat lipid panel

## 2024-12-19 NOTE — ASSESSMENT & PLAN NOTE
Side effects on Chantix. Refused tobacco cessation program referral. would like to try to stop smoking on his own.  Reinforce smoking cessation

## 2024-12-19 NOTE — ASSESSMENT & PLAN NOTE
NSTEMI PCI/KIMBERLY x 2 prox and mid LAD on 9/25/24  Denies any angina  On aspirin 81 mg/day, statin, beta-blocker  As it has been a year, since stent placement, and patient reports compliance to regimen, will discontinue Brilinta

## 2024-12-19 NOTE — ASSESSMENT & PLAN NOTE
Reports no angina  Continue on metoprolol 25 mg twice daily, aspirin and statins  Reinforce Smoking cessation , heart healthy diet, weight loss and good BP control  Education provided

## 2024-12-19 NOTE — PROGRESS NOTES
[facility]  General Cardiology Note  Ronald Agiurre  1970  [id number]      Referring Provider - No ref. provider found  Chief Complaint   Patient presents with    Follow-up     Sometimes fluttering    Shortness of Breath       Assessment & Plan  Primary hypertension  BP elevated  HR 58  Offered patient to start an antihypertensive. Patient refused and would like to start losing weight first  Also offered referral to tobacco cessation program and patient would like to try to stop smoking on his own.  Advised to continue BP and HR diary and call the office with questions or concerns  NSTEMI (non-ST elevated myocardial infarction) (HCC)   NSTEMI PCI/KIMBERLY x 2 prox and mid LAD on 9/25/24  Denies any angina  On aspirin 81 mg/day, statin, beta-blocker  As it has been a year, since stent placement, and patient reports compliance to regimen, will discontinue Brilinta  Coronary artery disease involving native coronary artery of native heart, unspecified whether angina present  Reports no angina  Continue on metoprolol 25 mg twice daily, aspirin and statins  Reinforce Smoking cessation , heart healthy diet, weight loss and good BP control  Education provided  Dyslipidemia  Lab Results   Component Value Date/Time    CHOLESTEROL 115 10/04/2023 11:05 AM    TRIG 85 10/04/2023 11:05 AM    HDL 28 (L) 10/04/2023 11:05 AM    LDLCALC 70 10/04/2023 11:05 AM   LDL goal <55 . No recent labs  Repeat lipid panel  Tobacco abuse  Side effects on Chantix. Refused tobacco cessation program referral. would like to try to stop smoking on his own.  Reinforce smoking cessation  Prediabetes  A1c 5.7% 1 year ago  Repeat labs  Acute coronary syndrome (HCC)       Relevant testing  TTE 9/25/23  EF 65%.  No RWMA.  Diastolic function normal. The following segments are hypokinetic: apical septal and apex.All other segments are normal.  Mild MR.  Mild TR.The aortic root is normal in size. The ascending aorta is mildly dilated.  The ascending aorta is 4 cm.  Cardiac catheterization 9/25/23   1st Mrg lesion is 50% stenosed.,Prox LAD lesion is 95% stenosed..Mid LAD lesion is 80% stenosed.    L Cx---Moderate in size.  Nondominant.  Mild diffuse disease throughout the vessel.  OM1: 50% stenosed.   RCA-- Disease throughout the vessel.  Intervention: PCI/DAMARIS x 2 prox and mid LAD    Will RTO in 6 months or sooner if necessary. Will call with any concerns.     Interval History: 54 y.o.  male  with PMH as below is here for routine visit  Patient of Dr. Vila  Patient reports that he had frequent episodes of diarrhea last year at the time of heart attack  He powerwashed his house the day before and thought that he had caught a bug  which then lead him to hospital and subsequently admitted and received coronary stents    He works in IT.  He is sedentary.    Today, states that he went back to smoking half a pack cigarettes daily. He has tried the nicotine patch . Is not interested in the referral program   From March to August he took the chantix then stopped because  of weight gain, depression. States that walking got boring for him which lead to inactivity   With weight gain he reports MILLS likely due to deconditioning. He  got a rowing machine a week ago and using it occasionally. Occasional palpitations after eating but not bothersome.  From 268 about a year ago- to 316 lbs today,  gained about 50 lbs  BP at home back in the summer was in the 120-130/80's but nost recently 140's/80  Has tried the low salt diet , including nuts. But often has oatmeal, pasta. Occasionally has a cheese steak. He has cut out canned food, snacks, no take outs    Patient Active Problem List    Diagnosis Date Noted    Tobacco abuse 12/19/2024    Prediabetes 12/19/2024    Hospital discharge follow-up 10/04/2023    Leukocytosis 09/25/2023    Diarrhea 09/25/2023    Coronary artery disease involving native coronary artery 09/25/2023    NSTEMI (non-ST elevated myocardial  "infarction) (HCC) 09/24/2023    Dyslipidemia 09/24/2023     History reviewed. No pertinent past medical history.  Social History     Tobacco Use    Smoking status: Every Day     Current packs/day: 0.50     Average packs/day: 0.5 packs/day for 20.0 years (10.0 ttl pk-yrs)     Types: Cigarettes    Smokeless tobacco: Current   Vaping Use    Vaping status: Never Used   Substance Use Topics    Alcohol use: Never    Drug use: Never      Family History   Problem Relation Age of Onset    Heart disease Maternal Grandmother      Past Surgical History:   Procedure Laterality Date    CARDIAC CATHETERIZATION N/A 9/25/2023    Procedure: Cardiac catheterization;  Surgeon: Rohith Kovacs MD;  [facility];  Service: Cardiology       Current Outpatient Medications:     aspirin 81 mg chewable tablet, Chew 1 tablet (81 mg total) daily, Disp: , Rfl:     atorvastatin (LIPITOR) 80 mg tablet, Take 1 tablet (80 mg total) by mouth daily with dinner, Disp: 90 tablet, Rfl: 3    metoprolol tartrate (LOPRESSOR) 25 mg tablet, Take 1 tablet (25 mg total) by mouth every 12 (twelve) hours, Disp: 180 tablet, Rfl: 3    acetaminophen (TYLENOL) 325 mg tablet, Take 3 tablets (975 mg total) by mouth every 8 (eight) hours as needed for mild pain, headaches or fever (Patient not taking: Reported on 12/19/2024), Disp: , Rfl: 0    nitroglycerin (NITROSTAT) 0.4 mg SL tablet, Place 1 tablet (0.4 mg total) under the tongue every 5 (five) minutes as needed for chest pain for up to 30 doses (Patient not taking: Reported on 12/19/2024), Disp: 90 tablet, Rfl: 0    Allergies   Allergen Reactions    Zoloft [Sertraline] Hives       Vitals:    12/19/24 1400 12/19/24 1435   BP: 150/90 154/92   BP Location: Left arm Left arm   Patient Position: Sitting    Cuff Size: Extra-Large    Pulse: 58    SpO2: 98%    Weight: (!) 144 kg (316 lb 8 oz)    Height: 6' 4\" (1.93 m)         Vitals:    12/19/24 1400   Weight: (!) 144 kg (316 lb 8 oz)      Height: 6' 4\" (193 " cm)   Body mass index is 38.53 kg/m².    Labs:   Lab Results   Component Value Date/Time    CHOLESTEROL 115 10/04/2023 11:05 AM    TRIG 85 10/04/2023 11:05 AM    HDL 28 (L) 10/04/2023 11:05 AM    LDLCALC 70 10/04/2023 11:05 AM      Lab Results   Component Value Date    SODIUM 137 10/04/2023    K 5.0 10/04/2023     10/04/2023    CREATININE 1.17 10/04/2023    EGFR 71 10/04/2023    BUN 17 10/04/2023    CO2 30 10/04/2023    ALT 10 09/24/2023    AST 16 09/24/2023    INR 0.89 09/24/2023    GLUF 93 10/04/2023    HGBA1C 5.7 (H) 09/25/2023    WBC 11.07 (H) 10/04/2023    HGB 16.8 10/04/2023    HCT 47.6 10/04/2023     10/04/2023         Imaging: No results found.    ECG:  Marked sinus bradycardia. Non specific T wave abnormality. 49 bpm   Reviewed by JOSEPH Silva      Review of Systems   Cardiovascular:  Positive for dyspnea on exertion and palpitations (flutter after eating, occasionally).   All other systems reviewed and are negative.    Except as noted in HPI, is otherwise reviewed in detail and a 12 point review of systems is negative.    Physical Exam  Vitals reviewed.   Constitutional:       General: He is not in acute distress.     Appearance: Normal appearance. He is not diaphoretic.   HENT:      Head: Normocephalic and atraumatic.   Eyes:      General: No scleral icterus.     Extraocular Movements: Extraocular movements intact.   Cardiovascular:      Rate and Rhythm: Regular rhythm. Bradycardia present.      Pulses: Normal pulses.           Radial pulses are 2+ on the right side and 2+ on the left side.      Heart sounds: Normal heart sounds, S1 normal and S2 normal.   Pulmonary:      Effort: Pulmonary effort is normal. No tachypnea or respiratory distress.      Breath sounds: Decreased breath sounds present. No wheezing or rales.   Abdominal:      General: Bowel sounds are normal. There is no distension.      Palpations: Abdomen is soft.   Musculoskeletal:      Right lower leg: No edema.      Left  lower leg: No edema.   Skin:     General: Skin is warm and dry.      Capillary Refill: Capillary refill takes less than 2 seconds.   Neurological:      Mental Status: He is alert and oriented to person, place, and time.      Gait: Gait normal.   Psychiatric:         Mood and Affect: Mood normal.         Behavior: Behavior normal.          **Please Note: This note may have been constructed using a voice recognition system**     Thank you for the opportunity to participate in the care of this patient.   JOSEPH Silva

## 2025-01-15 DIAGNOSIS — I24.9 ACUTE CORONARY SYNDROME (HCC): ICD-10-CM

## 2025-01-16 RX ORDER — METOPROLOL TARTRATE 25 MG/1
25 TABLET, FILM COATED ORAL EVERY 12 HOURS SCHEDULED
Qty: 180 TABLET | Refills: 1 | Status: SHIPPED | OUTPATIENT
Start: 2025-01-16 | End: 2026-01-11

## 2025-07-24 DIAGNOSIS — I24.9 ACUTE CORONARY SYNDROME (HCC): ICD-10-CM

## 2025-07-25 RX ORDER — METOPROLOL TARTRATE 25 MG/1
25 TABLET, FILM COATED ORAL EVERY 12 HOURS SCHEDULED
Qty: 180 TABLET | Refills: 1 | Status: SHIPPED | OUTPATIENT
Start: 2025-07-25 | End: 2026-07-20

## (undated) DEVICE — NC TREK NEO™ CORONARY DILATATION CATHETER 4.50 MM X 12 MM / RAPID-EXCHANGE: Brand: NC TREK NEO™

## (undated) DEVICE — TR BAND RADIAL ARTERY COMPRESSION DEVICE: Brand: TR BAND

## (undated) DEVICE — GLIDESHEATH BASIC HYDROPHILIC COATED INTRODUCER SHEATH: Brand: GLIDESHEATH

## (undated) DEVICE — DGW .035 FC J3MM 260CM TEF: Brand: EMERALD

## (undated) DEVICE — RUNTHROUGH NS EXTRA FLOPPY PTCA GUIDEWIRE: Brand: RUNTHROUGH

## (undated) DEVICE — GUIDEWIRE WHOLEY HI TORQUE INTERM MOD J .035 145CM

## (undated) DEVICE — CATH THROMBECTOMY EXPORT 6FR 140CM

## (undated) DEVICE — BALLOON NC EUPHORA 3 X 12MM

## (undated) DEVICE — CATH IVUS EAGLE EYE PLATINUM 5FR 150CM

## (undated) DEVICE — RADIFOCUS OPTITORQUE ANGIOGRAPHIC CATHETER: Brand: OPTITORQUE

## (undated) DEVICE — CATH GUIDE LAUNCHER 6FR EBU 3.5

## (undated) DEVICE — BALLOON NC EUPHORA 4 X 15MM